# Patient Record
Sex: MALE | Race: WHITE | Employment: FULL TIME | URBAN - METROPOLITAN AREA
[De-identification: names, ages, dates, MRNs, and addresses within clinical notes are randomized per-mention and may not be internally consistent; named-entity substitution may affect disease eponyms.]

---

## 2017-03-16 ENCOUNTER — OFFICE VISIT (OUTPATIENT)
Dept: FAMILY MEDICINE | Facility: CLINIC | Age: 47
End: 2017-03-16
Payer: COMMERCIAL

## 2017-03-16 VITALS
HEART RATE: 84 BPM | BODY MASS INDEX: 33.52 KG/M2 | HEIGHT: 66 IN | WEIGHT: 208.6 LBS | DIASTOLIC BLOOD PRESSURE: 86 MMHG | TEMPERATURE: 97.7 F | SYSTOLIC BLOOD PRESSURE: 116 MMHG

## 2017-03-16 DIAGNOSIS — E78.2 MIXED HYPERLIPIDEMIA: ICD-10-CM

## 2017-03-16 DIAGNOSIS — A69.20 LYME DISEASE: Primary | ICD-10-CM

## 2017-03-16 PROCEDURE — 99213 OFFICE O/P EST LOW 20 MIN: CPT | Performed by: NURSE PRACTITIONER

## 2017-03-16 RX ORDER — DOXYCYCLINE 100 MG/1
100 CAPSULE ORAL 2 TIMES DAILY
Qty: 28 CAPSULE | Refills: 0 | Status: SHIPPED | OUTPATIENT
Start: 2017-03-16 | End: 2017-03-30

## 2017-03-16 ASSESSMENT — ENCOUNTER SYMPTOMS
FATIGUE: 0
RHINORRHEA: 0
HEADACHES: 0
SHORTNESS OF BREATH: 0
SINUS PRESSURE: 0
COUGH: 0
VOMITING: 0
FEVER: 0
DIARRHEA: 0
WHEEZING: 0
EYE DISCHARGE: 0
DIAPHORESIS: 0
NAUSEA: 0
SORE THROAT: 0

## 2017-03-16 NOTE — MR AVS SNAPSHOT
After Visit Summary   3/16/2017    Lance Ramos    MRN: [id number]           Patient Information     Date Of Birth          1970        Visit Information        Provider Department      3/16/2017 1:40 PM Dayan Luna APRN [facility]        Today's Diagnoses     Lyme disease    -  1       Follow-ups after your visit        Additional Services     INFECTIOUS DISEASE REFERRAL       Your provider has referred you to: UM: [facility] [place] [phone]   [email or web address]  FHN: [facility] - [place] [phone]   [email or web address]    Please be aware that coverage of these services is subject to the terms and limitations of your health insurance plan.  Call member services at your health plan with any benefit or coverage questions.      Please bring the following with you to your appointment:    (1) Any X-Rays, CTs or MRIs which have been performed.  Contact the facility where they were done to arrange for  prior to your scheduled appointment.    (2) List of current medications   (3) This referral request   (4) Any documents/labs given to you for this referral                  Who to contact     Normal or non-critical lab and imaging results will be communicated to you by MyChart, letter or phone within 4 business days after the clinic has received the results. If you do not hear from us within 7 days, please contact the clinic through MyChart or phone. If you have a critical or abnormal lab result, we will notify you by phone as soon as possible.  Submit refill requests through [employer] or call your pharmacy and they will forward the refill request to us. Please allow 3 business days for your refill to be completed.          If you need to speak with a  for additional information , please call: [phone]         "   Additional Information About Your Visit        MyChart Information     [employer] gives you secure access to your electronic health record. If you see a primary care provider, you can also send messages to your care team and make appointments. If you have questions, please call your primary care clinic.  If you do not have a primary care provider, please call [phone] and they will assist you.        Care EveryWhere ID     This is your Care EveryWhere ID. This could be used by other organizations to access your [place] medical records  [id number]        Your Vitals Were     Pulse Temperature Height BMI (Body Mass Index)          84 97.7  F (36.5  C) (Tympanic) 5' 6\" (1.676 m) 33.67 kg/m2         Blood Pressure from Last 3 Encounters:   03/16/17 116/86   10/28/16 (!) 137/92   06/23/16 118/80    Weight from Last 3 Encounters:   03/16/17 208 lb 9.6 oz (94.6 kg)   10/28/16 204 lb (92.5 kg)   06/23/16 206 lb (93.4 kg)              We Performed the Following     INFECTIOUS DISEASE REFERRAL          Today's Medication Changes          These changes are accurate as of: 3/16/17  2:14 PM.  If you have any questions, ask your nurse or doctor.               Start taking these medicines.        Dose/Directions    doxycycline 100 MG capsule   Commonly known as:  VIBRAMYCIN   Used for:  Lyme disease   Started by:  Dayan Luna APRN CNP        Dose:  100 mg   Take 1 capsule (100 mg) by mouth 2 times daily for 14 days   Quantity:  28 capsule   Refills:  0            Where to get your medicines      These medications were sent to [id number] [place] [facility] - [address] [place] [address] [address] [place] AT [address] [place], [place] [address]     Phone:  [phone]     doxycycline 100 MG capsule                Primary Care Provider Office Phone # Fax #    Blanca Merritt [phone]       [place] [facility] [address] [place] [address]      "   Thank you!     Thank you for choosing [facility]  for your care. Our goal is always to provide you with excellent care. Hearing back from our patients is one way we can continue to improve our services. Please take a few minutes to complete the written survey that you may receive in the mail after your visit with us. Thank you!             Your Updated Medication List - Protect others around you: Learn how to safely use, store and throw away your medicines at www.disposemymeds.org.          This list is accurate as of: 3/16/17  2:14 PM.  Always use your most recent med list.                   Brand Name Dispense Instructions for use    atorvastatin 20 MG tablet    LIPITOR    90 tablet    Take 1 tablet (20 mg) by mouth daily       doxycycline 100 MG capsule    VIBRAMYCIN    28 capsule    Take 1 capsule (100 mg) by mouth 2 times daily for 14 days       HYDROcodone-acetaminophen 5-325 MG per tablet    NORCO    30 tablet    Take 1-2 tablets by mouth every 4 hours as needed for other (Moderate to Severe Pain).       hydrOXYzine 25 MG capsule    VISTARIL    30 capsule    Take 2 capsules by mouth every 6 hours as needed for itching.       IBUPROFEN PO          Multi-vitamin Tabs tablet   Generic drug:  multivitamin, therapeutic with minerals      Take 1 tablet by mouth daily Reported on 3/16/2017       OMEGA-3 FISH OIL PO      Reported on 3/16/2017       senna-docusate 8.6-50 MG per tablet    SENOKOT-S;PERICOLACE    14 tablet    Take 2 tablets by mouth daily as needed for constipation.       ZOCOR PO      Take 40 mg by mouth Reported on 3/16/2017

## 2017-03-16 NOTE — LETTER
[facility]  [address] [address]  [phone]        April 23, 2018    Lance Ramos  [address] [address] [address]              Dear Lance Ramos    This is to remind you that your fasting lab is due.    You may call our office at [phone] to schedule an appointment.    Please disregard this notice if you have already had your labs drawn or made an appointment.        Sincerely,        Dayan Luna RN, CNP

## 2017-03-16 NOTE — PROGRESS NOTES
SUBJECTIVE:                                                    Lance Ramos is a 46 year old male who presents to clinic today for the following health issues:      Musculoskeletal problem/pain      Duration: since October 2016    Description  Location: both feet and ankles     Intensity:  severe    Accompanying signs and symptoms: painful and swollen, stinging pain at left ankle    History  Previous similar problem: YES- had Lyme Disease 10/28/16-he was treated with doxycycline, 11/18/16 doxycycline was refilled for continued symptoms.  The only time he has complete relief is when he is taking doxycycline  Previous evaluation:  x-ray    Precipitating or alleviating factors:  Trauma or overuse: no   Aggravating factors include: not being on doxycycline    Therapies tried and outcome: doxycycline is effective     Numbness to feet off and on since Oct. Will have numbness to both feet. If get pain/stingling to left ankle. Will have pain to top of foot. Will have pain all over foot. If has pain in ankle will be sharp pain. If has pain to top of foot will be more sore like a bruise. If is in arch will feel like has to stretch it out. Has had ankle surgeries to both feet due to multiple sports injuries. This was about 8 years ago. If gets pian in ankle will pains to legs as well-from walking different. Will take some ibuprofen for pain and that does decrease the pain some. Usually tries to wear shoes with support. Did try and get more supportive shoes and that did help some but the pain is still there. Only time that was better when was on doxycycline. Believes that still may have lyme. Daughter had lyme and was treated for 3+ months with doxy. Wife also had lyme and was treated with doxy for 6 months.       Problem list and histories reviewed & adjusted, as indicated.  Additional history: as documented    Current Outpatient Prescriptions   Medication Sig Dispense Refill     IBUPROFEN PO        doxycycline  "(VIBRAMYCIN) 100 MG capsule Take 1 capsule (100 mg) by mouth 2 times daily for 14 days 28 capsule 0     No Known Allergies    Reviewed and updated as needed this visit by clinical staff  Tobacco  Allergies  Meds  Med Hx  Surg Hx  Fam Hx  Soc Hx      Reviewed and updated as needed this visit by Provider         ROS:  Review of Systems   Constitutional: Negative for diaphoresis, fatigue and fever.   HENT: Negative for congestion, ear pain, rhinorrhea, sinus pressure and sore throat.    Eyes: Negative for discharge.   Respiratory: Negative for cough, shortness of breath and wheezing.    Cardiovascular: Negative for chest pain.   Gastrointestinal: Negative for diarrhea, nausea and vomiting.   Musculoskeletal:        Pian to bilat feet and ankles      Neurological: Negative for headaches.         OBJECTIVE:                                                    /86 (BP Location: Left arm, Patient Position: Chair, Cuff Size: Adult Regular)  Pulse 84  Temp 97.7  F (36.5  C) (Tympanic)  Ht 5' 6\" (1.676 m)  Wt 208 lb 9.6 oz (94.6 kg)  BMI 33.67 kg/m2  Body mass index is 33.67 kg/(m^2).  Physical Exam   Constitutional: He appears well-developed and well-nourished.   HENT:   Head: Normocephalic and atraumatic.   Right Ear: Tympanic membrane and external ear normal.   Left Ear: Tympanic membrane and external ear normal.   Nose: No mucosal edema or rhinorrhea.   Cardiovascular: Normal rate, regular rhythm and normal heart sounds.    Pulmonary/Chest: Effort normal and breath sounds normal.   Abdominal: Soft. Bowel sounds are normal.   Neurological: He is alert.   Skin: Skin is warm and dry.   Psychiatric: He has a normal mood and affect.        ASSESSMENT/PLAN:                                                    1. Lyme disease  Requests referral to specialist  Declines repeat blood test  Declines refer to podiatry   - INFECTIOUS DISEASE REFERRAL  - doxycycline (VIBRAMYCIN) 100 MG capsule; Take 1 capsule (100 mg) by mouth " 2 times daily for 14 days  Dispense: 28 capsule; Refill: 0    2. Mixed hyperlipidemia  Return for fasting labs in late April     SHIVA Otero [facility]

## 2017-03-16 NOTE — NURSING NOTE
"Chief Complaint   Patient presents with     Foot Pain       Initial /86 (BP Location: Left arm, Patient Position: Chair, Cuff Size: Adult Regular)  Pulse 84  Temp 97.7  F (36.5  C) (Tympanic)  Ht 5' 6\" (1.676 m)  Wt 208 lb 9.6 oz (94.6 kg)  BMI 33.67 kg/m2 Estimated body mass index is 33.67 kg/(m^2) as calculated from the following:    Height as of this encounter: 5' 6\" (1.676 m).    Weight as of this encounter: 208 lb 9.6 oz (94.6 kg).  Medication Reconciliation: complete     April AMAIRANI Torres      "

## 2017-04-03 ENCOUNTER — TELEPHONE (OUTPATIENT)
Dept: FAMILY MEDICINE | Facility: CLINIC | Age: 47
End: 2017-04-03

## 2017-04-03 DIAGNOSIS — M79.671 PAIN IN BOTH FEET: Primary | ICD-10-CM

## 2017-04-03 DIAGNOSIS — M79.672 PAIN IN BOTH FEET: Primary | ICD-10-CM

## 2017-04-03 NOTE — TELEPHONE ENCOUNTER
Madelyn from [facility] called and stated Lance was seen today 4-03-17 at [facility] for left ankle pain and a MRI was ordered by [facility] physician for tomorrow 4-04-17    Please place referral for patient for [facility] , needs to be sent to patients health insurance company [employer].     Once referral is completed please route chart to [place] in referrals.     Coleen [facility]

## 2017-04-14 ENCOUNTER — THERAPY VISIT (OUTPATIENT)
Dept: PHYSICAL THERAPY | Facility: CLINIC | Age: 47
End: 2017-04-14
Payer: COMMERCIAL

## 2017-04-14 DIAGNOSIS — M76.822 LEFT TIBIALIS POSTERIOR TENDINITIS: Primary | ICD-10-CM

## 2017-04-14 PROCEDURE — 97161 PT EVAL LOW COMPLEX 20 MIN: CPT | Mod: GP | Performed by: PHYSICAL THERAPIST

## 2017-04-14 PROCEDURE — 97110 THERAPEUTIC EXERCISES: CPT | Mod: GP | Performed by: PHYSICAL THERAPIST

## 2017-04-14 NOTE — MR AVS SNAPSHOT
After Visit Summary   4/14/2017    Lance Ramos    MRN: [id number]           Patient Information     Date Of Birth          1970        Visit Information        Provider Department      4/14/2017 12:20 PM Barney Carter, PT [facility]        Today's Diagnoses     Left tibialis posterior tendinitis    -  1       Follow-ups after your visit        Your next 10 appointments already scheduled     Apr 20, 2017 11:00 AM CDT   [facility] Extremity with Barney Carter, PT   [facility] ([facility] [place]    [address]  [place] [address] [phone]            Apr 28, 2017  2:50 PM CDT   [facility] Extremity with Barney Carter, PT   [facility] ([facility] [place]    [address]  [place] [id number] [phone]              Who to contact     If you have questions or need follow up information about today's clinic visit or your schedule please contact [facility] directly at [phone].  Normal or non-critical lab and imaging results will be communicated to you by [place] community health[facility], letter or phone within 4 business days after the clinic has received the results. If you do not hear from us within 7 days, please contact the clinic through [employer] or phone. If you have a critical or abnormal lab result, we will notify you by phone as soon as possible.  Submit refill requests through [employer] or call your pharmacy and they will forward the refill request to us. Please allow 3 business days for your refill to be completed.          Additional Information About Your Visit        [place] community health[facility] Information     [employer] gives you secure access to your electronic health record. If you see a primary care provider, you can also send messages to your care team and make appointments. If you have questions, please call your primary care clinic.  If you do not have a primary care provider, please call [phone] and they will assist you.        Care EveryWhere ID      This is your Care EveryWhere ID. This could be used by other organizations to access your [place] medical records  [id number]         Blood Pressure from Last 3 Encounters:   03/16/17 116/86   10/28/16 (!) 137/92   06/23/16 118/80    Weight from Last 3 Encounters:   03/16/17 94.6 kg (208 lb 9.6 oz)   10/28/16 92.5 kg (204 lb)   06/23/16 93.4 kg (206 lb)              We Performed the Following     HC PT EVAL, LOW COMPLEXITY     [facility] INITIAL EVAL REPORT     THERAPEUTIC EXERCISES        Primary Care Provider Office Phone # Fax #    Blanca Merritt [phone]       [facility] [address] [place] [address]        Thank you!     Thank you for choosing [facility]  for your care. Our goal is always to provide you with excellent care. Hearing back from our patients is one way we can continue to improve our services. Please take a few minutes to complete the written survey that you may receive in the mail after your visit with us. Thank you!             Your Updated Medication List - Protect others around you: Learn how to safely use, store and throw away your medicines at www.disposemymeds.org.          This list is accurate as of: 4/14/17  1:25 PM.  Always use your most recent med list.                   Brand Name Dispense Instructions for use    IBUPROFEN PO

## 2017-04-14 NOTE — LETTER
[facility]  [address]  [place] [address]  [phone]    2017    Re: Lance Ramos   :   1970  MRN:  [id number]   REFERRING PHYSICIAN:   [facility]    Date of Initial Evaluation:  17  Visits:  Rxs Used: 1  Reason for Referral:  Left tibialis posterior tendinitis    [facility] Initial Evaluation    Subjective:  Patient is a 46 year old male presenting with rehab left ankle/foot hpi.   Lance Ramos is a 46 year old male with a left ankle and left foot condition.  Condition occurred with:  Repetition/overuse and a wrong landing.  Condition occurred: in the community and during recreation/sport.  This is a new condition  17 pt saw MD for L medial foot/ankle pain.    Patient reports pain:  Anterior, medial and lower leg.  Radiates to:  No radiation.  Pain is described as aching and sharp and is intermittent and reported as 9/10.  Associated symptoms:  Loss of motion/stiffness and loss of strength. Pain is worse during the day.  Symptoms are exacerbated by activity, certain positions and walking (playing hockey/skating) and relieved by rest.  Since onset symptoms are unchanged.  Special tests:  MRI (+ dalila-medial bone fragment that is not acute finding, posterior tibialis tendonitis).      General health as reported by patient is good.  Pertinent medical history includes:  None.  Medical allergies: no.  Other surgeries include:  Orthopedic surgery (B ankles, nose).  Current medications:  None as reported by patient.  Current occupation is   .  Patient is working in normal job without restrictions.  Primary job tasks include:  Prolonged sitting.  Barriers include:  None as reported by patient.  Red flags:  None as reported by patient.    ANKLE:     PROM L PROM R AROM L AROM R MMT L MMT R   Dorsiflexion   -5 -2(2shy of neutral) 5/5 5/5   Plantarflexion   45 46 5/5 5/5   Inversion    15 15 5/5 5/5   Eversion   2 10 5/5 5/5   G Toe Ext         G Toe Flex           Edema:  Mild edema over L dalila-medial ankle    Palpation: TTP L dalila-medial and postero-medial ankle    Gait: bilateral pronation L>R, increased ER on Lvs R    Special Tests: all negative   L R   Anterior Drawer     Posterior Drawer     Valgus Stress     Varus Stress     External Rotation     Ahn's (Achilles)     Scott's       Plan to improve B ankle AROM, strengthen foot intrinsics and eccentrics for post tib tendon, balance,manual therapy for joint mobility, return to sport as tolerated.    Assessment/Plan:    Patient is a 46 year old male with left side ankle complaints.    Patient has the following significant findings with corresponding treatment plan.                Diagnosis 1:  Post tibialis tendonitis  Pain -  hot/cold therapy, US, electric stimulation, manual therapy, splint/taping/bracing/orthotics, education and home program  Decreased ROM/flexibility - manual therapy, therapeutic exercise, therapeutic activity and home program  Decreased joint mobility - manual therapy, therapeutic exercise, therapeutic activity and home program    Therapy Evaluation Codes:   1) History comprised of:   Personal factors that impact the plan of care:      None.    Comorbidity factors that impact the plan of care are:      None.     Medications impacting care: None.  2) Examination of Body Systems comprised of:   Body structures and functions that impact the plan of care:      Ankle.   Activity limitations that impact the plan of care are:      Sports and Walking.  3) Clinical presentation characteristics are:   Stable/Uncomplicated.  4) Decision-Making    Low complexity using standardized patient assessment instrument and/or measureable assessment of functional outcome.    Cumulative Therapy Evaluation is: Low complexity.  Previous and current functional limitations:  (See Goal Flow Sheet for this information)    Short term and Long  term goals: (See Goal Flow Sheet for this information)   Communication ability:  Patient appears to be able to clearly communicate and understand verbal and written communication and follow directions correctly.  Treatment Explanation - The following has been discussed with the patient:   RX ordered/plan of care  Anticipated outcomes  Possible risks and side effects  This patient would benefit from PT intervention to resume normal activities.   Rehab potential is good.    Frequency:  1 X week, once daily  Duration:  for 12 weeks  Discharge Plan:  Achieve all LTG.  Independent in home treatment program.  Return to previous functional level by discharge.  Reach maximal therapeutic benefit.    Thank you for your referral.    INQUIRIES  Therapist: Barney Carter DPT [facility]YS[facility]AP[facility]  [address]  [place] [address]  Phone: [phone]  Fax: [phone]

## 2017-04-28 ENCOUNTER — THERAPY VISIT (OUTPATIENT)
Dept: PHYSICAL THERAPY | Facility: CLINIC | Age: 47
End: 2017-04-28
Payer: COMMERCIAL

## 2017-04-28 DIAGNOSIS — M76.822 LEFT TIBIALIS POSTERIOR TENDINITIS: ICD-10-CM

## 2017-04-28 PROCEDURE — 97110 THERAPEUTIC EXERCISES: CPT | Mod: GP | Performed by: PHYSICAL THERAPIST

## 2017-04-28 NOTE — MR AVS SNAPSHOT
After Visit Summary   4/28/2017    Lance Ramos    MRN: [id number]           Patient Information     Date Of Birth          1970        Visit Information        Provider Department      4/28/2017 2:50 PM Barney Carter PT [facility]        Today's Diagnoses     Left tibialis posterior tendinitis           Follow-ups after your visit        Who to contact     If you have questions or need follow up information about today's clinic visit or your schedule please contact [facility] directly at [phone].  Normal or non-critical lab and imaging results will be communicated to you by [employer], letter or phone within 4 business days after the clinic has received the results. If you do not hear from us within 7 days, please contact the clinic through [employer] or phone. If you have a critical or abnormal lab result, we will notify you by phone as soon as possible.  Submit refill requests through [employer] or call your pharmacy and they will forward the refill request to us. Please allow 3 business days for your refill to be completed.          Additional Information About Your Visit        MyChart Information     [employer] gives you secure access to your electronic health record. If you see a primary care provider, you can also send messages to your care team and make appointments. If you have questions, please call your primary care clinic.  If you do not have a primary care provider, please call [phone] and they will assist you.        Care EveryWhere ID     This is your Care EveryWhere ID. This could be used by other organizations to access your [facility] medical records  [id number]         Blood Pressure from Last 3 Encounters:   03/16/17 116/86   10/28/16 (!) 137/92   06/23/16 118/80    Weight from Last 3 Encounters:   03/16/17 94.6 kg (208 lb 9.6 oz)   10/28/16 92.5 kg (204 lb)   06/23/16 93.4 kg (206 lb)              We Performed the Following      THERAPEUTIC EXERCISES        Primary Care Provider Office Phone # Fax #    Blanca Merritt [phone]       [place] [facility] [address] [place] [address]        Thank you!     Thank you for choosing [facility]  for your care. Our goal is always to provide you with excellent care. Hearing back from our patients is one way we can continue to improve our services. Please take a few minutes to complete the written survey that you may receive in the mail after your visit with us. Thank you!             Your Updated Medication List - Protect others around you: Learn how to safely use, store and throw away your medicines at www.disposemymeds.org.          This list is accurate as of: 4/28/17  3:39 PM.  Always use your most recent med list.                   Brand Name Dispense Instructions for use    IBUPROFEN PO

## 2017-06-09 PROBLEM — M76.822 LEFT TIBIALIS POSTERIOR TENDINITIS: Status: RESOLVED | Noted: 2017-04-14 | Resolved: 2017-06-09

## 2017-06-09 NOTE — PROGRESS NOTES
Subjective:    HPI                    Objective:    System    Physical Exam    General     ROS    Assessment/Plan:      DISCHARGE  REPORT    Progress reporting period is from 4/14/17 to 4/28/17.     SUBJECTIVE  Subjective: pt reports ankle feels good at rest but continues to be painful once he places hockey boot onto foot. currently finished one league but is in playoffs for the other league once weekly. again refuses ionto due to cost out of pocket.   Current Pain level: 0/10   Initial Pain level: 9/10   Changes in function: Yes, see goal flow sheet for change in function   Adverse reactions: None;   ,     The objective findings are from DOS 4/28/17.    OBJECTIVE  Objective: AROM L ankle df: 5degs pf: 45degs inv: 22degs ev: 3degs      ASSESSMENT/PLAN  Updated problem list and treatment plan: Diagnosis 1:  L foot/ankle pain  STG/LTGs have been met or progress has been made towards goals:  Yes, progressing towards being able to play hockey painfree.  Assessment of Progress: The patient's condition is improving.  The patient's condition has potential to improve.  The patient has not returned to therapy. Current status is unknown.  Self Management Plans:  Patient is independent in a home treatment program.  Lance continues to require the following intervention to meet STG and LTG's: HEP  We will discharge this patient from PT.    Recommendations:  Pt was seen clinically twice with some improvement in those two visits in regards to pain intensity and frequency however pt did not return to clinic for further followup visits. Current status is unknown and plan to disch PT services.    Please refer to the daily flowsheet for treatment today, total treatment time and time spent performing 1:1 timed codes.

## 2017-10-25 ENCOUNTER — OFFICE VISIT (OUTPATIENT)
Dept: FAMILY MEDICINE | Facility: CLINIC | Age: 47
End: 2017-10-25
Payer: COMMERCIAL

## 2017-10-25 VITALS
TEMPERATURE: 98.5 F | HEART RATE: 94 BPM | WEIGHT: 216.4 LBS | SYSTOLIC BLOOD PRESSURE: 134 MMHG | BODY MASS INDEX: 34.78 KG/M2 | HEIGHT: 66 IN | DIASTOLIC BLOOD PRESSURE: 84 MMHG | OXYGEN SATURATION: 98 %

## 2017-10-25 DIAGNOSIS — M79.674 PAIN OF TOE OF RIGHT FOOT: Primary | ICD-10-CM

## 2017-10-25 DIAGNOSIS — M10.071 ACUTE IDIOPATHIC GOUT OF RIGHT FOOT: ICD-10-CM

## 2017-10-25 LAB
BASOPHILS # BLD AUTO: 0 10E9/L (ref 0–0.2)
BASOPHILS NFR BLD AUTO: 0.4 %
DIFFERENTIAL METHOD BLD: NORMAL
EOSINOPHIL # BLD AUTO: 0.2 10E9/L (ref 0–0.7)
EOSINOPHIL NFR BLD AUTO: 2.2 %
ERYTHROCYTE [DISTWIDTH] IN BLOOD BY AUTOMATED COUNT: 12.3 % (ref 10–15)
HCT VFR BLD AUTO: 45.4 % (ref 40–53)
HGB BLD-MCNC: 15.6 G/DL (ref 13.3–17.7)
LYMPHOCYTES # BLD AUTO: 2.3 10E9/L (ref 0.8–5.3)
LYMPHOCYTES NFR BLD AUTO: 24 %
MCH RBC QN AUTO: 31.3 PG (ref 26.5–33)
MCHC RBC AUTO-ENTMCNC: 34.4 G/DL (ref 31.5–36.5)
MCV RBC AUTO: 91 FL (ref 78–100)
MONOCYTES # BLD AUTO: 0.9 10E9/L (ref 0–1.3)
MONOCYTES NFR BLD AUTO: 9 %
NEUTROPHILS # BLD AUTO: 6.1 10E9/L (ref 1.6–8.3)
NEUTROPHILS NFR BLD AUTO: 64.4 %
PLATELET # BLD AUTO: 252 10E9/L (ref 150–450)
RBC # BLD AUTO: 4.98 10E12/L (ref 4.4–5.9)
WBC # BLD AUTO: 9.5 10E9/L (ref 4–11)

## 2017-10-25 PROCEDURE — 85025 COMPLETE CBC W/AUTO DIFF WBC: CPT | Performed by: PHYSICIAN ASSISTANT

## 2017-10-25 PROCEDURE — 36415 COLL VENOUS BLD VENIPUNCTURE: CPT | Performed by: PHYSICIAN ASSISTANT

## 2017-10-25 PROCEDURE — 84550 ASSAY OF BLOOD/URIC ACID: CPT | Performed by: PHYSICIAN ASSISTANT

## 2017-10-25 PROCEDURE — 99214 OFFICE O/P EST MOD 30 MIN: CPT | Performed by: PHYSICIAN ASSISTANT

## 2017-10-25 RX ORDER — INDOMETHACIN 50 MG/1
CAPSULE ORAL
Qty: 18 CAPSULE | Refills: 0 | Status: SHIPPED | OUTPATIENT
Start: 2017-10-25 | End: 2017-12-01

## 2017-10-25 NOTE — NURSING NOTE
"Chief Complaint   Patient presents with     Arthritis     x2 days        Initial LMP 02/24/2017 (Exact Date) Estimated body mass index is 23.49 kg/(m^2) as calculated from the following:    Height as of 4/4/17: 5' 7\" (1.702 m).    Weight as of 4/4/17: 150 lb (68 kg).  Medication Reconciliation: complete     Ursula Rico CMA   "

## 2017-10-25 NOTE — PROGRESS NOTES
SUBJECTIVE:   Lance Ramos is a 47 year old male who presents to clinic today for the following health issues:    Gout  Duration: 2-3 days   Description   Location: big toe - right  Joint Swelling: YES  Redness: YES  Pain intensity:  Severe 9/10  Accompanying signs and symptoms: None  History  Previous history of gout: No  Trauma to the area: no   Precipitating factors:   Alcohol usage: Occassional (usually on weekends)  Diuretic use: no   Recent illness: no   Therapies tried and outcome: Ibuprofen, arthritis cream     Had URI symptoms last week.      Problem list and histories reviewed & adjusted, as indicated.  Additional history: as documented    Patient Active Problem List   Diagnosis     CARDIOVASCULAR SCREENING; LDL GOAL LESS THAN 160     Mixed hyperlipidemia     Past Surgical History:   Procedure Laterality Date     ARTHROSCOPY ANKLE  3/28/2012    Procedure:ARTHROSCOPY ANKLE; ANKLE ARTHROSCOPY, DEBRIDEMENT, LOOSE BODY EXCISION  (C-ARM); Surgeon:SOPHY BRAUN; Location:[facility]     ARTHROSCOPY ANKLE, OPEN REPAIR TENDON, COMBINED  7/23/2012    Procedure: COMBINED ARTHROSCOPY ANKLE, OPEN REPAIR TENDON;  LEFT ANKLE ARTHROSCOPY AND BROSTROM REPAIR ;  Surgeon: Sophy Braun MD;  Location: [facility]     ENT SURGERY      EPISTAXIS POSTERIOR SURGERY     LASIK       REPAIR TENDON FOOT  7/23/2012    Procedure: REPAIR TENDON FOOT;  Brostrom Repair;  Surgeon: Sophy Braun MD;  Location: [facility]     VASECTOMY         Social History   Substance Use Topics     Smoking status: Never Smoker     Smokeless tobacco: Never Used     Alcohol use Yes      Comment: 0 - 2 PER WEEK     History reviewed. No pertinent family history.      Current Outpatient Prescriptions   Medication Sig Dispense Refill     indomethacin (INDOCIN) 50 MG capsule 1 tab three times per day with meals x 4 days, then 1 tab two times per day x 2 days then 1 tab once per day x 2 days. 18 capsule 0     IBUPROFEN PO        BP Readings from Last 3  "Encounters:   10/25/17 134/84   03/16/17 116/86   10/28/16 (!) 137/92    Wt Readings from Last 3 Encounters:   10/25/17 216 lb 6.4 oz (98.2 kg)   03/16/17 208 lb 9.6 oz (94.6 kg)   10/28/16 204 lb (92.5 kg)                        Reviewed and updated as needed this visit by clinical staff     Reviewed and updated as needed this visit by Provider         ROS:  Constitutional, HEENT, cardiovascular, pulmonary, gi and gu systems are negative, except as otherwise noted.      OBJECTIVE:   /84  Pulse 94  Temp 98.5  F (36.9  C) (Tympanic)  Ht 5' 6\" (1.676 m)  Wt 216 lb 6.4 oz (98.2 kg)  SpO2 98%  BMI 34.93 kg/m2  Body mass index is 34.93 kg/(m^2).  GENERAL: healthy, alert and no distress  Right great toe:  Swollen, mild erythema and slightly warm to the touch around MCP joint.  Full flexion extension of toes and ankle.  Posterior Tibialis and dorsalis pedis pulses intact bilaterally.  Capillary refill less than 2 seconds bilateral lower extremities.       Diagnostic Test Results:  Results for orders placed or performed in visit on 10/25/17   Uric acid   Result Value Ref Range    Uric Acid 6.4 3.5 - 7.2 mg/dL   CBC with platelets differential   Result Value Ref Range    WBC 9.5 4.0 - 11.0 10e9/L    RBC Count 4.98 4.4 - 5.9 10e12/L    Hemoglobin 15.6 13.3 - 17.7 g/dL    Hematocrit 45.4 40.0 - 53.0 %    MCV 91 78 - 100 fl    MCH 31.3 26.5 - 33.0 pg    MCHC 34.4 31.5 - 36.5 g/dL    RDW 12.3 10.0 - 15.0 %    Platelet Count 252 150 - 450 10e9/L    Diff Method Automated Method     % Neutrophils 64.4 %    % Lymphocytes 24.0 %    % Monocytes 9.0 %    % Eosinophils 2.2 %    % Basophils 0.4 %    Absolute Neutrophil 6.1 1.6 - 8.3 10e9/L    Absolute Lymphocytes 2.3 0.8 - 5.3 10e9/L    Absolute Monocytes 0.9 0.0 - 1.3 10e9/L    Absolute Eosinophils 0.2 0.0 - 0.7 10e9/L    Absolute Basophils 0.0 0.0 - 0.2 10e9/L       ASSESSMENT/PLAN:         1. Pain of toe of right foot  Uric acid level normal, however symptoms are very " suggestive of gout (especially given previous similar attack 1 year ago).  I have also considered cellulitis in my differential, however he is afebrile, white count normal and exam is consistent with gout.    - Uric acid  - CBC with platelets differential  - indomethacin (INDOCIN) 50 MG capsule; 1 tab three times per day with meals x 4 days, then 1 tab two times per day x 2 days then 1 tab once per day x 2 days.  Dispense: 18 capsule; Refill: 0    2. Acute idiopathic gout of right foot  Acute Gout Flare    Please refer to today's diagnoses and orders for further details.  I briefly discussed the pathophysiology of Gout and outlined the expected course.  Patient is to limit alcohol, meats and high purine foods.  Patient education materials on gout were given to patient.     I discussed the warning symptoms and signs that indicate an atypical course that would need urgent follow up including findings that would require a call to 911. I also discussed strategies that could provide symptomatic relief.            FUTURE APPOINTMENTS:       - Follow-up visit if symptoms worsen or fail to improve as anticipated.     Mona Lockwood PA-C  [facility]

## 2017-10-25 NOTE — PATIENT INSTRUCTIONS
Gout Diet  Gout is a painful condition caused by an excess of uric acid, a waste product made by the body. Uric acid forms crystals that collect in the joints. The immune response to these crystals brings on symptoms of joint pain and swelling. This is called a gout attack. Often, medications and diet changes are combined to manage gout. Below are some guidelines for changing your diet to help you manage gout and prevent attacks. Your health care provider will help you determine the best eating plan for you.     Eating to manage gout  Weight loss for those who are overweight may help reduce gout attacks.  Eat less of these foods  Eating too many foods containing purines may raise the levels of uric acid in your body. This raises your risk for a gout attack. Try to limit these foods and drinks:    Alcohol, such as beer and red wine. You may be told to avoid alcohol completely.    Soft drinks that contain sugar or high fructose corn syrup    Certain fish, including anchovies, sardines, fish eggs, and herring    Shellfish    Certain meats, such as red meat, hot dogs, luncheon meats, and turkey    Organ meats, such as liver, kidneys, and sweetbreads    Legumes, such as dried beans and peas    Other high fat foods such as gravy, whole milk, and high fat cheeses    Vegetables such as asparagus, cauliflower, spinach, and mushrooms used to be thought to contribute to an increased risk for a gout attack, but recent studies show that high purine vegetables don't increase the risk for a gout attack.  Eat more of these foods  Other foods may be helpful for people with gout. Add some of these foods to your diet:    Cherries contain chemicals that may lower uric acid.    Omega fatty acids. These are found in some fatty fish such as salmon, certain oils (flax, olive, or nut), and nuts themselves. Omega fatty acids may help prevent inflammation due to gout.    Dairy products that are low-fat or fat-free, such as cheese and  yogurt    Complex carbohydrate foods, including whole grains, brown rice, oats, and beans    Coffee, in moderation    Water, approximately 64 ounces per day  Follow-up care  Follow up with your healthcare provider as advised.  When to seek medical advice  Call your healthcare provider right away if any of these occur:    Return of gout symptoms, usually at night:    Severe pain, swelling, and heat in a joint, especially the base of the big toe    Affected joint is hard to move    Skin of the affected joint is purple or red    Fever of 100.4 F (38 C) or higher    Pain that doesn't get better even with prescribed medicine   Date Last Reviewed: 1/12/2016 2000-2017 [employer]. [address] [place] [address], [place] [address]. All rights reserved. This information is not intended as a substitute for professional medical care. Always follow your healthcare professional's instructions.

## 2017-10-25 NOTE — LETTER
October 26, 2017      Lance Ramos  [address] [address]        Dear Lance,     Your blood tests were all normal.  As we discussed in the office, your symptoms are consistent with gout and the indomethacin should help with the pain and swelling.   Please follow-up if you have any questions or concerns.     Sincerely,     Mona Lockwood PA-C     Resulted Orders   Uric acid   Result Value Ref Range    Uric Acid 6.4 3.5 - 7.2 mg/dL   CBC with platelets differential   Result Value Ref Range    WBC 9.5 4.0 - 11.0 10e9/L    RBC Count 4.98 4.4 - 5.9 10e12/L    Hemoglobin 15.6 13.3 - 17.7 g/dL    Hematocrit 45.4 40.0 - 53.0 %    MCV 91 78 - 100 fl    MCH 31.3 26.5 - 33.0 pg    MCHC 34.4 31.5 - 36.5 g/dL    RDW 12.3 10.0 - 15.0 %    Platelet Count 252 150 - 450 10e9/L    Diff Method Automated Method     % Neutrophils 64.4 %    % Lymphocytes 24.0 %    % Monocytes 9.0 %    % Eosinophils 2.2 %    % Basophils 0.4 %    Absolute Neutrophil 6.1 1.6 - 8.3 10e9/L    Absolute Lymphocytes 2.3 0.8 - 5.3 10e9/L    Absolute Monocytes 0.9 0.0 - 1.3 10e9/L    Absolute Eosinophils 0.2 0.0 - 0.7 10e9/L    Absolute Basophils 0.0 0.0 - 0.2 10e9/L

## 2017-10-25 NOTE — MR AVS SNAPSHOT
After Visit Summary   10/25/2017    Lance Ramos    MRN: [id number]           Patient Information     Date Of Birth          1970        Visit Information        Provider Department      10/25/2017 2:00 PM Mona Lockwood PA-C [facility]        Today's Diagnoses     Pain of toe of right foot    -  1      Care Instructions      Gout Diet  Gout is a painful condition caused by an excess of uric acid, a waste product made by the body. Uric acid forms crystals that collect in the joints. The immune response to these crystals brings on symptoms of joint pain and swelling. This is called a gout attack. Often, medications and diet changes are combined to manage gout. Below are some guidelines for changing your diet to help you manage gout and prevent attacks. Your health care provider will help you determine the best eating plan for you.     Eating to manage gout  Weight loss for those who are overweight may help reduce gout attacks.  Eat less of these foods  Eating too many foods containing purines may raise the levels of uric acid in your body. This raises your risk for a gout attack. Try to limit these foods and drinks:    Alcohol, such as beer and red wine. You may be told to avoid alcohol completely.    Soft drinks that contain sugar or high fructose corn syrup    Certain fish, including anchovies, sardines, fish eggs, and herring    Shellfish    Certain meats, such as red meat, hot dogs, luncheon meats, and turkey    Organ meats, such as liver, kidneys, and sweetbreads    Legumes, such as dried beans and peas    Other high fat foods such as gravy, whole milk, and high fat cheeses    Vegetables such as asparagus, cauliflower, spinach, and mushrooms used to be thought to contribute to an increased risk for a gout attack, but recent studies show that high purine vegetables don't increase the risk for a gout attack.  Eat more of these foods  Other foods may be helpful for  people with gout. Add some of these foods to your diet:    Cherries contain chemicals that may lower uric acid.    Omega fatty acids. These are found in some fatty fish such as salmon, certain oils (flax, olive, or nut), and nuts themselves. Omega fatty acids may help prevent inflammation due to gout.    Dairy products that are low-fat or fat-free, such as cheese and yogurt    Complex carbohydrate foods, including whole grains, brown rice, oats, and beans    Coffee, in moderation    Water, approximately 64 ounces per day  Follow-up care  Follow up with your healthcare provider as advised.  When to seek medical advice  Call your healthcare provider right away if any of these occur:    Return of gout symptoms, usually at night:    Severe pain, swelling, and heat in a joint, especially the base of the big toe    Affected joint is hard to move    Skin of the affected joint is purple or red    Fever of 100.4 F (38 C) or higher    Pain that doesn't get better even with prescribed medicine   Date Last Reviewed: 1/12/2016 2000-2017 [employer]. [address] [place] [address]. All rights reserved. This information is not intended as a substitute for professional medical care. Always follow your healthcare professional's instructions.                Follow-ups after your visit        Who to contact     Normal or non-critical lab and imaging results will be communicated to you by MyChart, letter or phone within 4 business days after the clinic has received the results. If you do not hear from us within 7 days, please contact the clinic through MyChart or phone. If you have a critical or abnormal lab result, we will notify you by phone as soon as possible.  Submit refill requests through [facility] or call your pharmacy and they will forward the refill request to us. Please allow 3 business days for your refill to be completed.          If you need to speak with a  for additional  "information , please call: [phone]           Additional Information About Your Visit        [email or web address] Information     [email or web address] gives you secure access to your electronic health record. If you see a primary care provider, you can also send messages to your care team and make appointments. If you have questions, please call your primary care clinic.  If you do not have a primary care provider, please call [phone] and they will assist you.        Care EveryWhere ID     This is your Care EveryWhere ID. This could be used by other organizations to access your [place] medical records  [id number]        Your Vitals Were     Pulse Temperature Height Pulse Oximetry BMI (Body Mass Index)       94 98.5  F (36.9  C) (Tympanic) 5' 6\" (1.676 m) 98% 34.93 kg/m2        Blood Pressure from Last 3 Encounters:   10/25/17 134/84   03/16/17 116/86   10/28/16 (!) 137/92    Weight from Last 3 Encounters:   10/25/17 216 lb 6.4 oz (98.2 kg)   03/16/17 208 lb 9.6 oz (94.6 kg)   10/28/16 204 lb (92.5 kg)              We Performed the Following     CBC with platelets differential     Uric acid          Today's Medication Changes          These changes are accurate as of: 10/25/17  2:34 PM.  If you have any questions, ask your nurse or doctor.               Start taking these medicines.        Dose/Directions    indomethacin 50 MG capsule   Commonly known as:  INDOCIN   Used for:  Pain of toe of right foot   Started by:  Mona Lockwood PA-C        1 tab three times per day with meals x 4 days, then 1 tab two times per day x 2 days then 1 tab once per day x 2 days.   Quantity:  18 capsule   Refills:  0            Where to get your medicines      These medications were sent to [facility] [address]  [address] [address]     Phone:  [phone]     indomethacin 50 MG capsule                Primary Care Provider Office Phone # Fax #    Blanca Merritt [phone] " [phone]       [address] [place] [address]        Equal Access to Services     CADY BETTENCOURT : Hadii aad ku hadrashmielise Soshelly, waaxda luqadaha, qaybta kaalmada vargasradhaabhishek, sharonda trejogamaljanine jones. So [facility] [phone].    ATENCIÓN: Si habla español, tiene a renee disposición servicios gratuitos de asistencia lingüística. Llame al [phone].    We comply with applicable federal civil rights laws and [place] laws. We do not discriminate on the basis of race, color, national origin, age, disability, sex, sexual orientation, or gender identity.            Thank you!     Thank you for choosing [facility] [place]  for your care. Our goal is always to provide you with excellent care. Hearing back from our patients is one way we can continue to improve our services. Please take a few minutes to complete the written survey that you may receive in the mail after your visit with us. Thank you!             Your Updated Medication List - Protect others around you: Learn how to safely use, store and throw away your medicines at www.disposemymeds.org.          This list is accurate as of: 10/25/17  2:34 PM.  Always use your most recent med list.                   Brand Name Dispense Instructions for use Diagnosis    IBUPROFEN PO           indomethacin 50 MG capsule    INDOCIN    18 capsule    1 tab three times per day with meals x 4 days, then 1 tab two times per day x 2 days then 1 tab once per day x 2 days.    Pain of toe of right foot

## 2017-10-26 LAB — URATE SERPL-MCNC: 6.4 MG/DL (ref 3.5–7.2)

## 2017-10-27 PROBLEM — M10.071 ACUTE IDIOPATHIC GOUT OF RIGHT FOOT: Status: ACTIVE | Noted: 2017-10-27

## 2017-10-31 ENCOUNTER — TELEPHONE (OUTPATIENT)
Dept: FAMILY MEDICINE | Facility: CLINIC | Age: 47
End: 2017-10-31

## 2017-10-31 DIAGNOSIS — M10.071 ACUTE IDIOPATHIC GOUT OF RIGHT FOOT: Primary | ICD-10-CM

## 2017-10-31 RX ORDER — COLCHICINE 0.6 MG/1
TABLET ORAL
Qty: 15 TABLET | Refills: 0 | Status: SHIPPED | OUTPATIENT
Start: 2017-10-31 | End: 2017-12-01

## 2017-10-31 NOTE — TELEPHONE ENCOUNTER
Patient called and said he is still having issues with his foot and would like to try the medication he can take everyday for gout.  Patient says its hard to put his shoe on.  He also wants to know if there is anything for instant relief till this medication kicks in.    Carmen Tsang, [facility]

## 2017-10-31 NOTE — TELEPHONE ENCOUNTER
Please call Lance,   I have sent a script of colchicine to his pharmacy.  Si tabs at the onset of flare, then 1 tab 2 hours later if pain persists.  We cannot start the daily medication until this flare up resolves.  I suggest he schedule a f/u visit if the pain does not improve or if he would like to discuss starting the preventative medication once this pain resolves.   Mona Lockwood PA-C

## 2017-10-31 NOTE — TELEPHONE ENCOUNTER
Patient called back and will  the medication. I did explain that his flare should subside prior to taking a preventative medication. Jolene Leblanc RN

## 2017-12-01 ENCOUNTER — OFFICE VISIT (OUTPATIENT)
Dept: FAMILY MEDICINE | Facility: CLINIC | Age: 47
End: 2017-12-01
Payer: COMMERCIAL

## 2017-12-01 VITALS
BODY MASS INDEX: 33.91 KG/M2 | HEART RATE: 88 BPM | TEMPERATURE: 96.6 F | WEIGHT: 211 LBS | HEIGHT: 66 IN | SYSTOLIC BLOOD PRESSURE: 131 MMHG | DIASTOLIC BLOOD PRESSURE: 82 MMHG

## 2017-12-01 DIAGNOSIS — M21.612 BILATERAL BUNIONS: ICD-10-CM

## 2017-12-01 DIAGNOSIS — M21.611 BILATERAL BUNIONS: ICD-10-CM

## 2017-12-01 DIAGNOSIS — M79.675 PAIN IN TOES OF BOTH FEET: ICD-10-CM

## 2017-12-01 DIAGNOSIS — Z23 NEED FOR PROPHYLACTIC VACCINATION AND INOCULATION AGAINST INFLUENZA: ICD-10-CM

## 2017-12-01 DIAGNOSIS — M10.9 GOUT INVOLVING TOE, UNSPECIFIED CAUSE, UNSPECIFIED CHRONICITY, UNSPECIFIED LATERALITY: Primary | ICD-10-CM

## 2017-12-01 DIAGNOSIS — M79.674 PAIN IN TOES OF BOTH FEET: ICD-10-CM

## 2017-12-01 PROBLEM — M10.071 ACUTE IDIOPATHIC GOUT OF RIGHT FOOT: Status: RESOLVED | Noted: 2017-10-27 | Resolved: 2017-12-01

## 2017-12-01 PROCEDURE — 36415 COLL VENOUS BLD VENIPUNCTURE: CPT | Performed by: PHYSICIAN ASSISTANT

## 2017-12-01 PROCEDURE — 90686 IIV4 VACC NO PRSV 0.5 ML IM: CPT | Performed by: PHYSICIAN ASSISTANT

## 2017-12-01 PROCEDURE — 99214 OFFICE O/P EST MOD 30 MIN: CPT | Mod: 25 | Performed by: PHYSICIAN ASSISTANT

## 2017-12-01 PROCEDURE — 80048 BASIC METABOLIC PNL TOTAL CA: CPT | Performed by: PHYSICIAN ASSISTANT

## 2017-12-01 PROCEDURE — 90471 IMMUNIZATION ADMIN: CPT | Performed by: PHYSICIAN ASSISTANT

## 2017-12-01 PROCEDURE — 84550 ASSAY OF BLOOD/URIC ACID: CPT | Performed by: PHYSICIAN ASSISTANT

## 2017-12-01 RX ORDER — PREDNISONE 20 MG/1
40 TABLET ORAL DAILY
Qty: 10 TABLET | Refills: 0 | Status: SHIPPED | OUTPATIENT
Start: 2017-12-01 | End: 2017-12-06

## 2017-12-01 NOTE — PATIENT INSTRUCTIONS
I am considering two causes for your foot pain.  It's either gout, which we will recheck the uric acid level and a 24 hour urine excretion level or it may be due to bunions (see below).     I have placed a referral to a podiatrist, I would like you to meet with them to discuss the bunions in more detail.    Call the following number for that ~ [phone]      Gout Diet  Gout is a painful condition caused by an excess of uric acid, a waste product made by the body. Uric acid forms crystals that collect in the joints. The immune response to these crystals brings on symptoms of joint pain and swelling. This is called a gout attack. Often, medications and diet changes are combined to manage gout. Below are some guidelines for changing your diet to help you manage gout and prevent attacks. Your health care provider will help you determine the best eating plan for you.     Eating to manage gout  Weight loss for those who are overweight may help reduce gout attacks.  Eat less of these foods  Eating too many foods containing purines may raise the levels of uric acid in your body. This raises your risk for a gout attack. Try to limit these foods and drinks:    Alcohol, such as beer and red wine. You may be told to avoid alcohol completely.    Soft drinks that contain sugar or high fructose corn syrup    Certain fish, including anchovies, sardines, fish eggs, and herring    Shellfish    Certain meats, such as red meat, hot dogs, luncheon meats, and turkey    Organ meats, such as liver, kidneys, and sweetbreads    Legumes, such as dried beans and peas    Other high fat foods such as gravy, whole milk, and high fat cheeses    Vegetables such as asparagus, cauliflower, spinach, and mushrooms used to be thought to contribute to an increased risk for a gout attack, but recent studies show that high purine vegetables don't increase the risk for a gout attack.  Eat more of these foods  Other foods may be helpful for people  with gout. Add some of these foods to your diet:    Cherries contain chemicals that may lower uric acid.    Omega fatty acids. These are found in some fatty fish such as salmon, certain oils (flax, olive, or nut), and nuts themselves. Omega fatty acids may help prevent inflammation due to gout.    Dairy products that are low-fat or fat-free, such as cheese and yogurt    Complex carbohydrate foods, including whole grains, brown rice, oats, and beans    Coffee, in moderation    Water, approximately 64 ounces per day  Follow-up care  Follow up with your healthcare provider as advised.  When to seek medical advice  Call your healthcare provider right away if any of these occur:    Return of gout symptoms, usually at night:    Severe pain, swelling, and heat in a joint, especially the base of the big toe    Affected joint is hard to move    Skin of the affected joint is purple or red    Fever of 100.4 F (38 C) or higher    Pain that doesn't get better even with prescribed medicine   Date Last Reviewed: 1/12/2016 2000-2017 The [employer]. [address] [place] [address]. All rights reserved. This information is not intended as a substitute for professional medical care. Always follow your healthcare professional's instructions.          Bunion    You have a bunion. This is a bony bump at the base of your big toe, along the inside edge of your foot. As the bump gets bigger, it can become red, swollen, and painful with shoe wear.  Bunions may occur if you wear shoes that are too tight and pinch your toes together. High heels may make this worse. In some cases a bunion is due to poor alignment of the foot and ankle. This puts extra weight on the instep of each foot.  Once a bunion forms, it changes the way weight is spread all across your foot. This causes the bunion to get worse over time. The big toe will bend more and more toward the other toes.  A minor bunion can be treated by:    Wearing  properly fitting shoes    Using bunion pads    Wearing shoe inserts, called orthotics, to better align the foot and ankle  Physical therapy with ultrasound or whirlpool baths can ease pain, redness, and swelling. Severe cases may require surgery. If you don t treat what is causing the bunion, it may get larger and more painful.  Home care    Limit high heels. These shoes force your foot forward, crowding the toes together.    Switch to comfortable shoes with a wide toe area. Or have your existing shoes stretched by a shoe repair shop.    Avoid shoes that are tight, narrow, or pointed.    If you are flat-footed, using arch supports may help prevent further deformity. The best shoe inserts are the ones custom made by a foot specialist, called a podiatrist, or other healthcare provider.    Put a bunion pad over the bunion to ease pressure of your shoe against the bunion. You can buy these pads at most pharmacies without a prescription    To reduce pain and swelling, apply an ice pack over the injured area for 15 to 20 minutes. Do this every 1 to 2 hours the first day. Keep using ice 3 to 4 times a day until the pain and swelling goes away.    To make an ice pack, put ice cubes in a sealed zip-lock plastic bag. Wrap the bag in a clean, thin towel or cloth. Never put ice or an ice pack directly on the skin.    You may use over-the-counter pain medicine to control pain, unless another medicine was prescribed. Talk with your provider before using these medicines if you have chronic liver or kidney disease, or ever had a stomach ulcer or GI (gastrointestinal) bleeding.  Follow-up care  Follow up with a podiatrist or foot doctor, or as advised.  If X-rays were taken, you will be notified of any new findings that may affect your care.  When to seek medical care  Contact your healthcare provider if any of the following occur:    Increasing pain or redness around the base of the big toe    Painful ingrown toenail, with redness  and swelling or pus around the nail  Date Last Reviewed: 11/21/2015 2000-2017 The [employer]. [address] [place] [address], [place] [address]. All rights reserved. This information is not intended as a substitute for professional medical care. Always follow your healthcare professional's instructions.

## 2017-12-01 NOTE — PROGRESS NOTES

## 2017-12-01 NOTE — PROGRESS NOTES
"  SUBJECTIVE:   Lance Ramos is a 47 year old male who presents to clinic today for the following health issues:      Chief Complaint   Patient presents with     Consult     patient had a recent flare up of gout in feet; wants to discuss medication     His symptoms have improved since starting the colchicine.  He noticed immediate relief once he start on the colchicine.      He did not tolerate indomethacin (developed headaches) and it did not help at all with his pain.      He plays sports and is worried about how this will affect his ability to participate in sports.      Has had approximately 3-4 episodes similar to this in the past and most recently had a flare-up of pain late October 2017.        He had ankle surgery about 5 years ago and since then has had these recurrent attacks of pain in both great toes.  He gets a tingling sensation in toes and will have swelling and difficulty moving his toes due to the swelling.  Toe is very sensitive to the touch at this time.    Playing hockey seems to trigger the pain (typically about 2-3 days after playing he has had these \"attacks\").          Problem list and histories reviewed & adjusted, as indicated.  Additional history: as documented    Patient Active Problem List   Diagnosis     CARDIOVASCULAR SCREENING; LDL GOAL LESS THAN 160     Mixed hyperlipidemia     Acute idiopathic gout of right foot     Past Surgical History:   Procedure Laterality Date     ARTHROSCOPY ANKLE  3/28/2012    Procedure:ARTHROSCOPY ANKLE; ANKLE ARTHROSCOPY, DEBRIDEMENT, LOOSE BODY EXCISION  (C-ARM); Surgeon:SOPHY BRAUN; Location:[facility]     ARTHROSCOPY ANKLE, OPEN REPAIR TENDON, COMBINED  7/23/2012    Procedure: COMBINED ARTHROSCOPY ANKLE, OPEN REPAIR TENDON;  LEFT ANKLE ARTHROSCOPY AND BROSTROM REPAIR ;  Surgeon: Sophy Braun MD;  Location: [facility]     ENT SURGERY      EPISTAXIS POSTERIOR SURGERY     LASIK       REPAIR TENDON FOOT  7/23/2012    Procedure: REPAIR TENDON FOOT;  " "Brostrom Repair;  Surgeon: Sophy Braun MD;  Location:  [facility]     VASECTOMY         Social History   Substance Use Topics     Smoking status: Never Smoker     Smokeless tobacco: Never Used     Alcohol use Yes      Comment: 0 - 2 PER WEEK     History reviewed. No pertinent family history.      Current Outpatient Prescriptions   Medication Sig Dispense Refill     predniSONE (DELTASONE) 20 MG tablet Take 2 tablets (40 mg) by mouth daily for 5 days 10 tablet 0     IBUPROFEN PO        BP Readings from Last 3 Encounters:   12/01/17 131/82   10/25/17 134/84   03/16/17 116/86    Wt Readings from Last 3 Encounters:   12/01/17 211 lb (95.7 kg)   10/25/17 216 lb 6.4 oz (98.2 kg)   03/16/17 208 lb 9.6 oz (94.6 kg)                        Reviewed and updated as needed this visit by clinical staffTobacco  Allergies  Meds  Med Hx  Surg Hx  Fam Hx  Soc Hx      Reviewed and updated as needed this visit by Provider         ROS:  Constitutional, HEENT, cardiovascular, pulmonary, gi and gu systems are negative, except as otherwise noted.      OBJECTIVE:   /82  Pulse 88  Temp 96.6  F (35.9  C) (Tympanic)  Ht 5' 6\" (1.676 m)  Wt 211 lb (95.7 kg)  BMI 34.06 kg/m2  Body mass index is 34.06 kg/(m^2).  GENERAL: healthy, alert and no distress  Foot examination performed.  No lesions or skin breakdown noted.   Dorsalis pedis and posterior tibialis pulses intact bilaterally.  Bunion formation noted bilaterally.  Full ROM of toes and ankle.  Gait normal.  No swelling, warmth or redness today.     Diagnostic Test Results:  none     ASSESSMENT/PLAN:         1. Pain in toes of both feet  With the acute onset of great toe pain, gout is still high in my differential.  He did respond to the colchicine finally, however this improvement may have just been due to rest and time.  I suggest we recheck a uric acid level and also a 24 hour urine to see if those provide some direction to these pain flare-ups.  Checked BMP today to " serve as a baseline if we decide to start allopurinol on a preventative basis.     I am not convinced however, that gout is fully responsible for these pain flare-ups especially after discussing his history in more detail.  These symptoms very well could be due to his bunions and the tight fitting hockey skates are triggering the swelling/pain.  I suggest he meet with a podiatrist to review.    - Uric acid timed urine with Creat Ratio; Future  - Uric acid  - Basic metabolic panel  - predniSONE (DELTASONE) 20 MG tablet; Take 2 tablets (40 mg) by mouth daily for 5 days  Dispense: 10 tablet; Refill: 0  - ORTHO  REFERRAL    2 Gout involving toe, unspecified cause/chronicity   Repeat uric acid level elevated, I suggest starting allopurinol for gout prophylaxis.      We discussed the pathophysiology of Gout and outlined the expected course.  Patient is to limit alcohol, meats and high purine foods.  Patient education materials on gout were given to patient.     I discussed the warning symptoms and signs that indicate an atypical course that would need urgent follow up including findings that would require a call to 911. I also discussed strategies that could provide symptomatic relief.      Baseline BMP obtained today and renal function stable.      For gout flares, will use prednisone burst instead (as indomethacin did not work well).         2. Bilateral bunions  F/u with podiatry to discuss bunions.   - ORTHO  REFERRAL    Flu shot done.      CONSULTATION/REFERRAL to podiatry.    Mona Lockwood PA-C  [facility]

## 2017-12-01 NOTE — MR AVS SNAPSHOT
After Visit Summary   12/1/2017    Lance Ramos    MRN: [id number]           Patient Information     Date Of Birth          1970        Visit Information        Provider Department      12/1/2017 2:20 PM Mona Lockwood PA-C [facility]        Today's Diagnoses     Acute idiopathic gout of right foot    -  1    Bilateral bunions          Care Instructions      I am considering two causes for your foot pain.  It's either gout, which we will recheck the uric acid level and a 24 hour urine excretion level or it may be due to bunions (see below).     I have placed a referral to a podiatrist, I would like you to meet with them to discuss the bunions in more detail.    Call the following number for that ~ [phone]      Gout Diet  Gout is a painful condition caused by an excess of uric acid, a waste product made by the body. Uric acid forms crystals that collect in the joints. The immune response to these crystals brings on symptoms of joint pain and swelling. This is called a gout attack. Often, medications and diet changes are combined to manage gout. Below are some guidelines for changing your diet to help you manage gout and prevent attacks. Your health care provider will help you determine the best eating plan for you.     Eating to manage gout  Weight loss for those who are overweight may help reduce gout attacks.  Eat less of these foods  Eating too many foods containing purines may raise the levels of uric acid in your body. This raises your risk for a gout attack. Try to limit these foods and drinks:    Alcohol, such as beer and red wine. You may be told to avoid alcohol completely.    Soft drinks that contain sugar or high fructose corn syrup    Certain fish, including anchovies, sardines, fish eggs, and herring    Shellfish    Certain meats, such as red meat, hot dogs, luncheon meats, and turkey    Organ meats, such as liver, kidneys, and sweetbreads    Legumes,  such as dried beans and peas    Other high fat foods such as gravy, whole milk, and high fat cheeses    Vegetables such as asparagus, cauliflower, spinach, and mushrooms used to be thought to contribute to an increased risk for a gout attack, but recent studies show that high purine vegetables don't increase the risk for a gout attack.  Eat more of these foods  Other foods may be helpful for people with gout. Add some of these foods to your diet:    Cherries contain chemicals that may lower uric acid.    Omega fatty acids. These are found in some fatty fish such as salmon, certain oils (flax, olive, or nut), and nuts themselves. Omega fatty acids may help prevent inflammation due to gout.    Dairy products that are low-fat or fat-free, such as cheese and yogurt    Complex carbohydrate foods, including whole grains, brown rice, oats, and beans    Coffee, in moderation    Water, approximately 64 ounces per day  Follow-up care  Follow up with your healthcare provider as advised.  When to seek medical advice  Call your healthcare provider right away if any of these occur:    Return of gout symptoms, usually at night:    Severe pain, swelling, and heat in a joint, especially the base of the big toe    Affected joint is hard to move    Skin of the affected joint is purple or red    Fever of 100.4 F (38 C) or higher    Pain that doesn't get better even with prescribed medicine   Date Last Reviewed: 1/12/2016 2000-2017 [employer]. [address] [place] [address]. All rights reserved. This information is not intended as a substitute for professional medical care. Always follow your healthcare professional's instructions.          Bunion    You have a bunion. This is a bony bump at the base of your big toe, along the inside edge of your foot. As the bump gets bigger, it can become red, swollen, and painful with shoe wear.  Bunions may occur if you wear shoes that are too tight and pinch your toes  together. High heels may make this worse. In some cases a bunion is due to poor alignment of the foot and ankle. This puts extra weight on the instep of each foot.  Once a bunion forms, it changes the way weight is spread all across your foot. This causes the bunion to get worse over time. The big toe will bend more and more toward the other toes.  A minor bunion can be treated by:    Wearing properly fitting shoes    Using bunion pads    Wearing shoe inserts, called orthotics, to better align the foot and ankle  Physical therapy with ultrasound or whirlpool baths can ease pain, redness, and swelling. Severe cases may require surgery. If you don t treat what is causing the bunion, it may get larger and more painful.  Home care    Limit high heels. These shoes force your foot forward, crowding the toes together.    Switch to comfortable shoes with a wide toe area. Or have your existing shoes stretched by a shoe repair shop.    Avoid shoes that are tight, narrow, or pointed.    If you are flat-footed, using arch supports may help prevent further deformity. The best shoe inserts are the ones custom made by a foot specialist, called a podiatrist, or other healthcare provider.    Put a bunion pad over the bunion to ease pressure of your shoe against the bunion. You can buy these pads at most pharmacies without a prescription    To reduce pain and swelling, apply an ice pack over the injured area for 15 to 20 minutes. Do this every 1 to 2 hours the first day. Keep using ice 3 to 4 times a day until the pain and swelling goes away.    To make an ice pack, put ice cubes in a sealed zip-lock plastic bag. Wrap the bag in a clean, thin towel or cloth. Never put ice or an ice pack directly on the skin.    You may use over-the-counter pain medicine to control pain, unless another medicine was prescribed. Talk with your provider before using these medicines if you have chronic liver or kidney disease, or ever had a stomach ulcer  or GI (gastrointestinal) bleeding.  Follow-up care  Follow up with a podiatrist or foot doctor, or as advised.  If X-rays were taken, you will be notified of any new findings that may affect your care.  When to seek medical care  Contact your healthcare provider if any of the following occur:    Increasing pain or redness around the base of the big toe    Painful ingrown toenail, with redness and swelling or pus around the nail  Date Last Reviewed: 11/21/2015 2000-2017 [employer]. [address] [place] [address]. All rights reserved. This information is not intended as a substitute for professional medical care. Always follow your healthcare professional's instructions.                Follow-ups after your visit        Additional Services     ORTHO  REFERRAL       [facility] is referring you to the Orthopedic  Services at [facility].       The  Representative will assist you in the coordination of your Orthopedic and Musculoskeletal Care as prescribed by your physician.    The  Representative will call you within 1 business day to help schedule your appointment, or you may contact the  Representative at:    All areas ~ [phone]     Type of Referral : [facility] / [facility]       Timeframe requested: Routine    Coverage of these services is subject to the terms and limitations of your health insurance plan.  Please call member services at your health plan with any benefit or coverage questions.      If X-rays, CT or MRI's have been performed, please contact the facility where they were done to arrange for , prior to your scheduled appointment.  Please bring this referral request to your appointment and present it to your specialist.                  Future tests that were ordered for you today     Open Future Orders        Priority Expected Expires Ordered    Uric acid timed  "urine with Creat Ratio Routine  12/31/2017 12/1/2017            Who to contact     Normal or non-critical lab and imaging results will be communicated to you by [employer], letter or phone within 4 business days after the clinic has received the results. If you do not hear from us within 7 days, please contact the clinic through [employer] or phone. If you have a critical or abnormal lab result, we will notify you by phone as soon as possible.  Submit refill requests through [employer] or call your pharmacy and they will forward the refill request to us. Please allow 3 business days for your refill to be completed.          If you need to speak with a  for additional information , please call: [phone]           Additional Information About Your Visit        [employer] Information     [employer] gives you secure access to your electronic health record. If you see a primary care provider, you can also send messages to your care team and make appointments. If you have questions, please call your primary care clinic.  If you do not have a primary care provider, please call [phone] and they will assist you.        Care EveryWhere ID     This is your Care EveryWhere ID. This could be used by other organizations to access your [facility] medical records  [id number]        Your Vitals Were     Pulse Temperature Height BMI (Body Mass Index)          88 96.6  F (35.9  C) (Tympanic) 5' 6\" (1.676 m) 34.06 kg/m2         Blood Pressure from Last 3 Encounters:   12/01/17 131/82   10/25/17 134/84   03/16/17 116/86    Weight from Last 3 Encounters:   12/01/17 211 lb (95.7 kg)   10/25/17 216 lb 6.4 oz (98.2 kg)   03/16/17 208 lb 9.6 oz (94.6 kg)              We Performed the Following     Basic metabolic panel     ORTHO  REFERRAL     Uric acid          Today's Medication Changes          These changes are accurate as of: 12/1/17  3:01 PM.  If you have any questions, ask your nurse or doctor.               Start " taking these medicines.        Dose/Directions    predniSONE 20 MG tablet   Commonly known as:  DELTASONE   Used for:  Acute idiopathic gout of right foot   Started by:  Mona Lockwood PA-C        Dose:  40 mg   Take 2 tablets (40 mg) by mouth daily for 5 days   Quantity:  10 tablet   Refills:  0            Where to get your medicines      These medications were sent to [id number] [place] [address] [place] AT [address] [place], [place] [address]     Phone:  [phone]     predniSONE 20 MG tablet                Primary Care Provider Office Phone # Fax #    Blanca Merritt [phone] [address] [place] [address]        Equal Access to Services     JORDAN BETTENCOURT : Hadii jonathan ku hadasho Soomaali, waaxda luqadaha, qaybta kaalmada adeegyada, waxay kekein hayaan vargas andrade . So [facility] [phone].    ATENCIÓN: Si habla español, tiene a renee disposición servicios gratuitos de asistencia lingüística. Llame al [phone].    We comply with applicable federal civil rights laws and [place] laws. We do not discriminate on the basis of race, color, national origin, age, disability, sex, sexual orientation, or gender identity.            Thank you!     Thank you for choosing [facility]  for your care. Our goal is always to provide you with excellent care. Hearing back from our patients is one way we can continue to improve our services. Please take a few minutes to complete the written survey that you may receive in the mail after your visit with us. Thank you!             Your Updated Medication List - Protect others around you: Learn how to safely use, store and throw away your medicines at www.disposemymeds.org.          This list is accurate as of: 12/1/17  3:01 PM.  Always use your most recent med list.                   Brand Name Dispense Instructions for use Diagnosis    IBUPROFEN  PO           predniSONE 20 MG tablet    DELTASONE    10 tablet    Take 2 tablets (40 mg) by mouth daily for 5 days    Acute idiopathic gout of right foot

## 2017-12-02 LAB
ANION GAP SERPL CALCULATED.3IONS-SCNC: 8 MMOL/L (ref 3–14)
BUN SERPL-MCNC: 11 MG/DL (ref 7–30)
CALCIUM SERPL-MCNC: 9.3 MG/DL (ref 8.5–10.1)
CHLORIDE SERPL-SCNC: 99 MMOL/L (ref 94–109)
CO2 SERPL-SCNC: 29 MMOL/L (ref 20–32)
CREAT SERPL-MCNC: 1.02 MG/DL (ref 0.66–1.25)
GFR SERPL CREATININE-BSD FRML MDRD: 78 ML/MIN/1.7M2
GLUCOSE SERPL-MCNC: 95 MG/DL (ref 70–99)
POTASSIUM SERPL-SCNC: 3.9 MMOL/L (ref 3.4–5.3)
SODIUM SERPL-SCNC: 136 MMOL/L (ref 133–144)
URATE SERPL-MCNC: 8.2 MG/DL (ref 3.5–7.2)

## 2017-12-03 PROCEDURE — 84560 ASSAY OF URINE/URIC ACID: CPT | Performed by: PHYSICIAN ASSISTANT

## 2017-12-03 PROCEDURE — 81050 URINALYSIS VOLUME MEASURE: CPT | Performed by: PHYSICIAN ASSISTANT

## 2017-12-04 DIAGNOSIS — M79.675 PAIN IN TOES OF BOTH FEET: ICD-10-CM

## 2017-12-04 DIAGNOSIS — M79.674 PAIN IN TOES OF BOTH FEET: ICD-10-CM

## 2017-12-04 PROBLEM — M10.9 GOUT INVOLVING TOE, UNSPECIFIED CAUSE, UNSPECIFIED CHRONICITY, UNSPECIFIED LATERALITY: Status: ACTIVE | Noted: 2017-12-04

## 2017-12-04 PROBLEM — M10.9 ACUTE GOUTY ARTHRITIS: Status: ACTIVE | Noted: 2017-12-04

## 2017-12-04 LAB
COLLECT DURATION TIME UR: 24 H
CREAT 24H UR-MRATE: 2.01 G/(24.H) (ref 1–2)
CREAT UR-MCNC: 106 MG/DL
SPECIMEN VOL UR: 1900 ML

## 2017-12-04 RX ORDER — ALLOPURINOL 100 MG/1
TABLET ORAL
Qty: 60 TABLET | Refills: 0 | Status: SHIPPED | OUTPATIENT
Start: 2017-12-04 | End: 2018-01-11

## 2017-12-04 RX ORDER — ALLOPURINOL 100 MG/1
100 TABLET ORAL DAILY
Qty: 45 TABLET | Refills: 0 | Status: CANCELLED | OUTPATIENT
Start: 2017-12-04

## 2017-12-05 LAB
URATE 24H UR-MRATE: 0.33 G/G CR
URATE UR-MCNC: NORMAL MG/DL

## 2018-01-11 ENCOUNTER — TELEPHONE (OUTPATIENT)
Dept: FAMILY MEDICINE | Facility: CLINIC | Age: 48
End: 2018-01-11

## 2018-01-11 DIAGNOSIS — M10.9 GOUT INVOLVING TOE, UNSPECIFIED CAUSE, UNSPECIFIED CHRONICITY, UNSPECIFIED LATERALITY: ICD-10-CM

## 2018-01-11 RX ORDER — ALLOPURINOL 100 MG/1
TABLET ORAL
Qty: 60 TABLET | Refills: 0 | Status: SHIPPED | OUTPATIENT
Start: 2018-01-11 | End: 2018-03-13

## 2018-01-11 NOTE — TELEPHONE ENCOUNTER
Reason for call: Patient is calling with question in regards to zyloprim wondering if  could refill that medication or does he need an appoinement. Please advise    Phone number to reach patient:  Cell number on file:    Telephone Information:   Mobile [phone]       Best Time:  anytime    Can we leave a detailed message on this number?  YES

## 2018-01-12 NOTE — TELEPHONE ENCOUNTER
Please call patient, I have sent a pradip refill of his zyloprim (allopurinol), but he is due to recheck his uric acid level for further refills (lab only appt, non-fasting).    Mona Lockwood PA-C

## 2018-03-13 DIAGNOSIS — M10.9 GOUT INVOLVING TOE, UNSPECIFIED CAUSE, UNSPECIFIED CHRONICITY, UNSPECIFIED LATERALITY: ICD-10-CM

## 2018-03-13 NOTE — TELEPHONE ENCOUNTER
Requested Prescriptions   Pending Prescriptions Disp Refills     allopurinol (ZYLOPRIM) 100 MG tablet 60 tablet 0     Si tab daily x 1 week then 2 tabs daily    There is no refill protocol information for this order        Last Written Prescription Date:  18  Last Fill Quantity: 60,  # refills: 0   Last office visit: 2017 with prescribing provider:  17   Future Office Visit:   Next 5 appointments (look out 90 days)     Mar 14, 2018  7:40 AM CDT   SHORT with Mona Lockwood PA-C   [facility] ([facility])    [address] [address] [phone]

## 2018-03-14 DIAGNOSIS — M10.9 GOUT INVOLVING TOE, UNSPECIFIED CAUSE, UNSPECIFIED CHRONICITY, UNSPECIFIED LATERALITY: ICD-10-CM

## 2018-03-14 DIAGNOSIS — M10.9 ACUTE GOUTY ARTHRITIS: Primary | ICD-10-CM

## 2018-03-14 LAB — URATE SERPL-MCNC: 6.8 MG/DL (ref 3.5–7.2)

## 2018-03-14 PROCEDURE — 36415 COLL VENOUS BLD VENIPUNCTURE: CPT | Performed by: PHYSICIAN ASSISTANT

## 2018-03-14 PROCEDURE — 84550 ASSAY OF BLOOD/URIC ACID: CPT | Performed by: PHYSICIAN ASSISTANT

## 2018-03-14 RX ORDER — ALLOPURINOL 100 MG/1
TABLET ORAL
Qty: 60 TABLET | Refills: 5 | Status: SHIPPED | OUTPATIENT
Start: 2018-03-14 | End: 2018-03-15

## 2018-03-14 NOTE — TELEPHONE ENCOUNTER
Prescription approved per [facility] Protocol or patient Primary care provider (PCP)  SYLVIE Guerrero RN/Albino Neri

## 2018-03-15 RX ORDER — ALLOPURINOL 100 MG/1
200 TABLET ORAL DAILY
Qty: 60 TABLET | Refills: 5 | Status: SHIPPED | OUTPATIENT
Start: 2018-03-15 | End: 2018-12-10

## 2018-10-15 ENCOUNTER — TELEPHONE (OUTPATIENT)
Dept: FAMILY MEDICINE | Facility: CLINIC | Age: 48
End: 2018-10-15

## 2018-10-15 DIAGNOSIS — M10.071 ACUTE IDIOPATHIC GOUT OF RIGHT FOOT: ICD-10-CM

## 2018-10-15 RX ORDER — COLCHICINE 0.6 MG/1
TABLET ORAL
Qty: 15 TABLET | Refills: 0 | Status: SHIPPED | OUTPATIENT
Start: 2018-10-15 | End: 2018-12-10

## 2018-10-15 NOTE — TELEPHONE ENCOUNTER
Patient needs this med filled today if possible please.    Thank you  Albino Berg [facility]

## 2018-10-15 NOTE — TELEPHONE ENCOUNTER
"Requested Prescriptions   Pending Prescriptions Disp Refills     colchicine (COLCRYS) 0.6 MG tablet 15 tablet 0    Last Written Prescription Date:  10/31/2017 #15 x 0  Last filled 10/31/2017  Last office visit: 2017 SANDRA Lockwood   Future Office Visit: None     Note: medication is not on the current med list    Si tabs at the onset of flare, then 1 tab 2 hours later if pain persists.    Gout Agents Protocol Failed    10/15/2018  2:41 PM       Failed - ALT on file in past 12 months    No lab results found.         Failed - Has Uric Acid on file in past 12 months and value is less than 6    Recent Labs   Lab Test  18   0801   URIC  6.8     If level is 6mg/dL or greater, ok to refill one time and refer to provider.          Passed - CBC on file in past 12 months    Recent Labs   Lab Test  10/25/17   1436   WBC  9.5   RBC  4.98   HGB  15.6   HCT  45.4   PLT  252       For [facility] ONLY: EILC248 = WBC, LSOB674 = RBC         Passed - Recent (12 mo) or future (30 days) visit within the authorizing provider's specialty    Patient had office visit in the last 12 months or has a visit in the next 30 days with authorizing provider or within the authorizing provider's specialty.  See \"Patient Info\" tab in inbasket, or \"Choose Columns\" in Meds & Orders section of the refill encounter.           Passed - Patient is age 18 or older       Passed - Normal serum creatinine on file in the past 12 months    Recent Labs   Lab Test  17   1504   CR  1.02               "

## 2018-10-16 NOTE — TELEPHONE ENCOUNTER
Please call patient, due for recheck for further refills, please assist patient in scheduling appt (annual physical exam).  Kaur refill given.       Mona Lockwood PA-C

## 2018-12-10 ENCOUNTER — OFFICE VISIT (OUTPATIENT)
Dept: FAMILY MEDICINE | Facility: CLINIC | Age: 48
End: 2018-12-10
Payer: COMMERCIAL

## 2018-12-10 VITALS
WEIGHT: 214.2 LBS | TEMPERATURE: 98.5 F | BODY MASS INDEX: 34.42 KG/M2 | HEIGHT: 66 IN | SYSTOLIC BLOOD PRESSURE: 132 MMHG | RESPIRATION RATE: 20 BRPM | DIASTOLIC BLOOD PRESSURE: 88 MMHG | HEART RATE: 100 BPM

## 2018-12-10 DIAGNOSIS — Z12.11 SPECIAL SCREENING FOR MALIGNANT NEOPLASMS, COLON: Primary | ICD-10-CM

## 2018-12-10 DIAGNOSIS — M10.071 ACUTE IDIOPATHIC GOUT OF RIGHT FOOT: ICD-10-CM

## 2018-12-10 PROCEDURE — 99213 OFFICE O/P EST LOW 20 MIN: CPT | Performed by: NURSE PRACTITIONER

## 2018-12-10 RX ORDER — ALLOPURINOL 300 MG/1
300 TABLET ORAL DAILY
Qty: 90 TABLET | Refills: 1 | Status: SHIPPED | OUTPATIENT
Start: 2018-12-10 | End: 2019-01-17

## 2018-12-10 RX ORDER — COLCHICINE 0.6 MG/1
TABLET ORAL
Qty: 15 TABLET | Refills: 2 | Status: SHIPPED | OUTPATIENT
Start: 2018-12-10 | End: 2019-01-17

## 2018-12-10 RX ORDER — COLCHICINE 0.6 MG/1
TABLET ORAL
Qty: 15 TABLET | Refills: 0 | Status: SHIPPED | OUTPATIENT
Start: 2018-12-10 | End: 2018-12-10

## 2018-12-10 ASSESSMENT — ENCOUNTER SYMPTOMS
HEADACHES: 0
SORE THROAT: 0
SINUS PRESSURE: 0
RHINORRHEA: 0
VOMITING: 0
FEVER: 0
SHORTNESS OF BREATH: 0
DIARRHEA: 0
NAUSEA: 0
WHEEZING: 0
NUMBNESS: 0
COUGH: 0
EYE DISCHARGE: 0
ARTHRALGIAS: 1
DIAPHORESIS: 0
FATIGUE: 0

## 2018-12-10 ASSESSMENT — PAIN SCALES - GENERAL: PAINLEVEL: EXTREME PAIN (9)

## 2018-12-10 ASSESSMENT — MIFFLIN-ST. JEOR: SCORE: 1770.38

## 2018-12-10 NOTE — PATIENT INSTRUCTIONS
These are general instructions and may not be specific to you. Please call, email or follow up if you have any questions or concerns.     Patient Education     Gout    Gout is an inflammation of a joint due to a build-up of gout crystals in the joint fluid. This occurs when there is an excess of uric acid (a normal waste product) in the body. Uric acid builds up in the body when the kidneys are unable to filter enough of it from the blood. This may occur with age. It is also associated with kidney disease. Gout occurs more often in people with obesity, diabetes, high blood pressure, or high levels of fats in the blood. It may run in families. Gout tends to come and go. A flare up of gout is called an attack. Drinking alcohol or eating certain foods (such as shellfish or foods with additives such as high-fructose corn syrup) may increase uric acid levels in the blood and cause a gout attack.  During a gout attack, the affected joint may become a hot, red, swollen and painful. If you have had one attack of gout, you are likely to have another. An attack of gout can be treated with medicine. If these attacks become frequent, a daily medicine may be prescribed to help the kidneys remove uric acid from the body.  Home care  During a gout attack:    Rest painful joints. If gout affects the joints of your foot or leg, you may want to use crutches for the first few days to keep from bearing weight on the affected joint.    When sitting or lying down, raise the painful joint to a level higher than your heart.    Apply an ice pack (ice cubes in a plastic bag wrapped in a thin towel) over the injured area for 20 minutes every 1 to 2 hours the first day for pain relief. Continue this 3 to 4 times a day for swelling and pain.    Avoid alcohol and foods listed below (see Preventing attacks) during a gout attack. Drink extra fluid to help flush the uric acid through your kidneys.    If you were prescribed a medicine to treat gout,  take it as your healthcare provider has instructed. Don't skip doses.    Take anti-inflammatory medicine as directed.     If pain medicines have been prescribed, take them exactly as directed.    Preventing attacks    Minimize or avoid alcohol use. Excess alcohol intake can cause a gout attack.    Limit these foods and beverages:  ? Organ meats, such as kidneys and liver  ? Certain seafoods (anchovies, sardines, shrimp, scallops, herring, mackerel)  ? Wild game, meat extracts and meat gravies  ? Foods and beverages sweetened with high-fructose corn syrup, such as sodas    Eat a healthy diet including low-fat and nonfat dairy, whole grains, and vegetables.    If you are overweight, talk to your healthcare provider about a weight reduction plan. Avoid fasting or extreme low calorie diets (less than 900 calories per day). This will increase uric acid levels in the body.    If you have diabetes or high blood pressure, work with your doctor to manage these conditions.    Protect the joint from injury. Trauma can trigger a gout attack.  Follow-up care  Follow up with your healthcare provider, or as advised.   When to seek medical advice  Call your healthcare provider if you have any of the following:    Fever over 100.4 F (38. C) with worsening joint pain    Increasing redness around the joint    Pain developing in another joint    Repeated vomiting, abdominal pain, or blood in the vomit or stool (black or red color)  Date Last Reviewed: 3/1/2017    5579-4936 [employer]. [address] [place] [address], [place] [address]. All rights reserved. This information is not intended as a substitute for professional medical care. Always follow your healthcare professional's instructions.

## 2018-12-10 NOTE — PROGRESS NOTES
SUBJECTIVE:   Lance Ramos is a 48 year old male who presents to clinic today for the following health issues:      Medication Followup of allopurinol 100 mg    Taking Medication as prescribed: NO-only took as needed when first prescribed in March. Now is taking 3 tablets daily.     Side Effects:  None    Medication Helping Symptoms:  yes     Medication Followup of colchicine 0.6 mg    Taking Medication as prescribed: Currently out of mediation. Currently has pain, swelling and redness to right ankle. Pain is sharp and throbbing.  Sleeping is difficult. Pain started over the weekend. Took oxycodone last night from a family member and it helped.     Side Effects:  None    Medication Helping Symptoms:  Yes when he takes it, needs refill       Problem list and histories reviewed & adjusted, as indicated.  Additional history: as documented    Current Outpatient Medications   Medication Sig Dispense Refill     allopurinol (ZYLOPRIM) 300 MG tablet Take 1 tablet (300 mg) by mouth daily 90 tablet 1     colchicine (COLCRYS) 0.6 MG tablet 2 tabs at the onset of flare, then 1 tab 2 hours later if pain persists.  Needs to be seen by provider for further refills 15 tablet 2     IBUPROFEN PO        No Known Allergies    Reviewed and updated as needed this visit by clinical staff  Tobacco  Allergies  Meds  Med Hx  Surg Hx  Fam Hx  Soc Hx      Reviewed and updated as needed this visit by Provider          Here today for pain to right ankle.  Has had gout multiple times in the past.  Previously had been started on allopurinol 200 mg.  Usually has some colchicine at home for gout flares, but ran out. Friday is when it started. No injury to ankle that is aware of. Pain is to right ankle. Has been taking 3 tabs of allopurinol for the last 3 weeks. Previous to this last time last flare up was in early Nov.     ROS:  Review of Systems   Constitutional: Negative for diaphoresis, fatigue and fever.   HENT: Negative for  "congestion, ear pain, rhinorrhea, sinus pressure and sore throat.    Eyes: Negative for discharge.   Respiratory: Negative for cough, shortness of breath and wheezing.    Cardiovascular: Negative for chest pain.   Gastrointestinal: Negative for diarrhea, nausea and vomiting.   Musculoskeletal: Positive for arthralgias (right ankle).   Neurological: Negative for numbness and headaches.         OBJECTIVE:     /88 (BP Location: Right arm, Patient Position: Sitting, Cuff Size: Adult Large)   Pulse 100   Temp 98.5  F (36.9  C) (Tympanic)   Resp 20   Ht 1.67 m (5' 5.75\")   Wt 97.2 kg (214 lb 3.2 oz)   BMI 34.84 kg/m    Body mass index is 34.84 kg/m .  Physical Exam   Constitutional: He appears well-developed and well-nourished.   Cardiovascular: Normal rate, regular rhythm and normal heart sounds.   Pulmonary/Chest: Effort normal and breath sounds normal.   Musculoskeletal:        Feet:    Neurological: He is alert.   Skin: Skin is warm and dry.       ASSESSMENT/PLAN:   1. Special screening for malignant neoplasms, colon  Order placed, plans to call per self and set up  - GASTROENTEROLOGY ADULT REF PROCEDURE ONLY    2. Acute idiopathic gout of right foot  We will plan to increase allopurinol from 200 mg daily to 300 mg orally daily.  Refill of colchicine given.  Reinforced foods and drink that may increase reoccurrence of gout.  Notify if no improvement.  - allopurinol (ZYLOPRIM) 300 MG tablet; Take 1 tablet (300 mg) by mouth daily  Dispense: 90 tablet; Refill: 1  - colchicine (COLCRYS) 0.6 MG tablet; 2 tabs at the onset of flare, then 1 tab 2 hours later if pain persists.  Needs to be seen by provider for further refills  Dispense: 15 tablet; Refill: 2  - CBC with platelets differential        SHIVA Otero [facility]"

## 2018-12-31 ENCOUNTER — TELEPHONE (OUTPATIENT)
Dept: FAMILY MEDICINE | Facility: CLINIC | Age: 48
End: 2018-12-31

## 2018-12-31 DIAGNOSIS — M10.071 ACUTE IDIOPATHIC GOUT OF RIGHT FOOT: ICD-10-CM

## 2018-12-31 NOTE — TELEPHONE ENCOUNTER
Reason for Call:  Medication or medication refill:    Do you use a [facility]?  Name of the pharmacy and phone number for the current request:  [email or web address]    Name of the medication requested: colchicine (COLCRYS) 0.6 MG tablet    Other request: call when refilled     Can we leave a detailed message on this number? YES    Phone number patient can be reached at: Cell number on file:    Telephone Information:   Mobile [phone]       Best Time: anytime     Call taken on 12/31/2018 at 7:30 AM by Linda Garber

## 2018-12-31 NOTE — TELEPHONE ENCOUNTER
Spoke with patient  He is having another gout flare , RT foot ankle and toes, review chart advised has refill at pharmacy to call them for refill  May call back as need  SYLVIE Freeman  Clinic  RN/Albino Neri

## 2019-01-07 DIAGNOSIS — M25.571 CHRONIC PAIN OF RIGHT ANKLE: Primary | ICD-10-CM

## 2019-01-07 DIAGNOSIS — G89.29 CHRONIC PAIN OF RIGHT ANKLE: Primary | ICD-10-CM

## 2019-01-17 DIAGNOSIS — M10.071 ACUTE IDIOPATHIC GOUT OF RIGHT FOOT: ICD-10-CM

## 2019-01-17 RX ORDER — COLCHICINE 0.6 MG/1
0.6 TABLET ORAL DAILY
Qty: 90 TABLET | Refills: 1 | Status: SHIPPED | OUTPATIENT
Start: 2019-01-17 | End: 2020-01-08

## 2019-08-21 ENCOUNTER — TELEPHONE (OUTPATIENT)
Dept: FAMILY MEDICINE | Facility: CLINIC | Age: 49
End: 2019-08-21

## 2019-08-21 NOTE — LETTER
September 6, 2019      Lance Ramos  [id number] [facility] [address]      Dear Lance Ramos    We care about your health and have reviewed your health plan including your medical conditions, medication list, and lab results.  Based on this review, it is recommended that you follow up regarding the following health topic(s):     -Cholesterol  -Colon Cancer Screening  -Wellness (Physical) Visit     We recommend you take the following action(s):  -schedule a WELLNESS (Physical) APPOINTMENT.  We will perform the following labs: Lipids (fasting cholesterol - nothing to eat except water and/or meds for 8-10 hours).    Please call us at [phone] (or use [employer]) to address the above recommendations.     Thank you for trusting [facility] and we appreciate the opportunity to serve you.  We look forward to supporting your healthcare needs in the future.    Healthy Regards,      Your Health Care Team  [facility]

## 2019-08-21 NOTE — TELEPHONE ENCOUNTER
Panel Management Review      Patient has the following on his problem list:       IVD   ASA: FAILED    Last LDL:    Lab Results   Component Value Date    CHOL 182 10/28/2016     Lab Results   Component Value Date    HDL 37 10/28/2016     Lab Results   Component Value Date     10/28/2016     Lab Results   Component Value Date    TRIG 192 10/28/2016        Lab Results   Component Value Date    CHOLHDLRATIO 4.0 12/12/2014        Is the patient on a Statin? NO   Is the patient on Aspirin? NO                    Last three blood pressure readings:  BP Readings from Last 3 Encounters:   12/10/18 132/88   12/01/17 131/82   10/25/17 134/84        Tobacco History:     History   Smoking Status     Never Smoker   Smokeless Tobacco     Never Used         Composite cancer screening  Chart review shows that this patient is due/due soon for the following Colonoscopy/Fecal Colorectal (FIT)  Summary:    Patient is due/failing the following:     Health Maintenance Due   Topic Date Due     COLONOSCOPY  08/18/1978     HIV SCREENING  08/18/1983     PREVENTIVE CARE VISIT  12/12/2015     ZOSTER IMMUNIZATION (1 of 2) 08/18/2018     PHQ-2  01/01/2019         Action needed:   Patient needs office visit for physical with fasting labs and discuss health maintenance.    Type of outreach:    8/21/19  Phone, left message for patient to call back.  and Sent [employer] message.     8/30/19  Left message for patient to return call to clinic or review [employer].    9/6/19  Letter sent.    Questions for provider review:    None                                                                                                                                    Aparna Torres CMA       Chart routed to none.

## 2020-01-08 ENCOUNTER — OFFICE VISIT (OUTPATIENT)
Dept: FAMILY MEDICINE | Facility: CLINIC | Age: 50
End: 2020-01-08
Payer: COMMERCIAL

## 2020-01-08 VITALS
SYSTOLIC BLOOD PRESSURE: 100 MMHG | RESPIRATION RATE: 16 BRPM | HEIGHT: 66 IN | BODY MASS INDEX: 31.63 KG/M2 | WEIGHT: 196.8 LBS | DIASTOLIC BLOOD PRESSURE: 76 MMHG | HEART RATE: 76 BPM | TEMPERATURE: 95.4 F

## 2020-01-08 DIAGNOSIS — Z00.00 ROUTINE GENERAL MEDICAL EXAMINATION AT A HEALTH CARE FACILITY: Primary | ICD-10-CM

## 2020-01-08 DIAGNOSIS — Z12.11 SPECIAL SCREENING FOR MALIGNANT NEOPLASMS, COLON: ICD-10-CM

## 2020-01-08 DIAGNOSIS — Z80.0 FAMILY HISTORY OF PANCREATIC CANCER: ICD-10-CM

## 2020-01-08 DIAGNOSIS — S76.312A STRAIN OF LEFT HAMSTRING, INITIAL ENCOUNTER: ICD-10-CM

## 2020-01-08 DIAGNOSIS — E78.5 HYPERLIPIDEMIA LDL GOAL <100: Primary | ICD-10-CM

## 2020-01-08 DIAGNOSIS — M77.12 LATERAL EPICONDYLITIS OF LEFT ELBOW: ICD-10-CM

## 2020-01-08 DIAGNOSIS — Z23 NEED FOR INFLUENZA VACCINATION: ICD-10-CM

## 2020-01-08 DIAGNOSIS — Z12.5 SCREENING FOR PROSTATE CANCER: ICD-10-CM

## 2020-01-08 DIAGNOSIS — M10.071 ACUTE IDIOPATHIC GOUT OF RIGHT FOOT: ICD-10-CM

## 2020-01-08 LAB
ALBUMIN SERPL-MCNC: 4.5 G/DL (ref 3.4–5)
ALP SERPL-CCNC: 57 U/L (ref 40–150)
ALT SERPL W P-5'-P-CCNC: 42 U/L (ref 0–70)
AMYLASE SERPL-CCNC: 76 U/L (ref 30–110)
ANION GAP SERPL CALCULATED.3IONS-SCNC: 7 MMOL/L (ref 3–14)
AST SERPL W P-5'-P-CCNC: 30 U/L (ref 0–45)
BILIRUB SERPL-MCNC: 0.9 MG/DL (ref 0.2–1.3)
BUN SERPL-MCNC: 21 MG/DL (ref 7–30)
CALCIUM SERPL-MCNC: 9.7 MG/DL (ref 8.5–10.1)
CHLORIDE SERPL-SCNC: 105 MMOL/L (ref 94–109)
CHOLEST SERPL-MCNC: 271 MG/DL
CO2 SERPL-SCNC: 27 MMOL/L (ref 20–32)
CREAT SERPL-MCNC: 1.16 MG/DL (ref 0.66–1.25)
GFR SERPL CREATININE-BSD FRML MDRD: 72 ML/MIN/{1.73_M2}
GLUCOSE SERPL-MCNC: 89 MG/DL (ref 70–99)
HDLC SERPL-MCNC: 52 MG/DL
LDLC SERPL CALC-MCNC: 146 MG/DL
LIPASE SERPL-CCNC: 199 U/L (ref 73–393)
NONHDLC SERPL-MCNC: 219 MG/DL
POTASSIUM SERPL-SCNC: 4 MMOL/L (ref 3.4–5.3)
PROT SERPL-MCNC: 8.4 G/DL (ref 6.8–8.8)
PSA SERPL-ACNC: 1.4 UG/L (ref 0–4)
SODIUM SERPL-SCNC: 139 MMOL/L (ref 133–144)
TRIGL SERPL-MCNC: 366 MG/DL
URATE SERPL-MCNC: 9 MG/DL (ref 3.5–7.2)

## 2020-01-08 PROCEDURE — 80053 COMPREHEN METABOLIC PANEL: CPT | Performed by: NURSE PRACTITIONER

## 2020-01-08 PROCEDURE — G0103 PSA SCREENING: HCPCS | Performed by: NURSE PRACTITIONER

## 2020-01-08 PROCEDURE — 82150 ASSAY OF AMYLASE: CPT | Performed by: NURSE PRACTITIONER

## 2020-01-08 PROCEDURE — 83690 ASSAY OF LIPASE: CPT | Performed by: NURSE PRACTITIONER

## 2020-01-08 PROCEDURE — 99396 PREV VISIT EST AGE 40-64: CPT | Mod: 25 | Performed by: NURSE PRACTITIONER

## 2020-01-08 PROCEDURE — 36415 COLL VENOUS BLD VENIPUNCTURE: CPT | Performed by: NURSE PRACTITIONER

## 2020-01-08 PROCEDURE — 90471 IMMUNIZATION ADMIN: CPT | Performed by: NURSE PRACTITIONER

## 2020-01-08 PROCEDURE — 90682 RIV4 VACC RECOMBINANT DNA IM: CPT | Performed by: NURSE PRACTITIONER

## 2020-01-08 PROCEDURE — 84550 ASSAY OF BLOOD/URIC ACID: CPT | Performed by: NURSE PRACTITIONER

## 2020-01-08 PROCEDURE — 99214 OFFICE O/P EST MOD 30 MIN: CPT | Mod: 25 | Performed by: NURSE PRACTITIONER

## 2020-01-08 PROCEDURE — 80061 LIPID PANEL: CPT | Performed by: NURSE PRACTITIONER

## 2020-01-08 RX ORDER — NAPROXEN 500 MG/1
TABLET ORAL
Qty: 60 TABLET | Refills: 0 | Status: SHIPPED | OUTPATIENT
Start: 2020-01-08 | End: 2022-07-13

## 2020-01-08 RX ORDER — COLCHICINE 0.6 MG/1
0.6 TABLET ORAL DAILY PRN
Qty: 30 TABLET | Refills: 1 | Status: SHIPPED | OUTPATIENT
Start: 2020-01-08 | End: 2022-07-13

## 2020-01-08 RX ORDER — ROSUVASTATIN CALCIUM 20 MG/1
20 TABLET, COATED ORAL DAILY
Qty: 90 TABLET | Refills: 1 | Status: SHIPPED | OUTPATIENT
Start: 2020-01-08 | End: 2022-07-13

## 2020-01-08 ASSESSMENT — ENCOUNTER SYMPTOMS
DIARRHEA: 0
PALPITATIONS: 0
COUGH: 0
FREQUENCY: 0
SORE THROAT: 0
VOMITING: 0
HEADACHES: 0
RHINORRHEA: 0
MYALGIAS: 0
NAUSEA: 0
SHORTNESS OF BREATH: 0
SINUS PRESSURE: 0
DIZZINESS: 0
LIGHT-HEADEDNESS: 0
ARTHRALGIAS: 1
WHEEZING: 0
EYE DISCHARGE: 0
BRUISES/BLEEDS EASILY: 0
CONFUSION: 0
NUMBNESS: 0
DIAPHORESIS: 0
FATIGUE: 0
FEVER: 0
DYSURIA: 0
BLOOD IN STOOL: 0
CHEST TIGHTNESS: 0

## 2020-01-08 ASSESSMENT — MIFFLIN-ST. JEOR: SCORE: 1682.49

## 2020-01-08 ASSESSMENT — PAIN SCALES - GENERAL: PAINLEVEL: EXTREME PAIN (8)

## 2020-01-08 NOTE — PATIENT INSTRUCTIONS
Go to the pharmacy for shingles vaccine.     No additional over the counter ibuprofen, Advil, naproxen, aleve. Ok to take Tylenol or acetaminophen.       Preventive Health Recommendations  Male Ages 50 - 64    Yearly exam:             See your health care provider every year in order to  o   Review health changes.   o   Discuss preventive care.    o   Review your medicines if your doctor has prescribed any.     Have a cholesterol test every 5 years, or more frequently if you are at risk for high cholesterol/heart disease.     Have a diabetes test (fasting glucose) every three years. If you are at risk for diabetes, you should have this test more often.     Have a colonoscopy at age 50, or have a yearly FIT test (stool test). These exams will check for colon cancer.      Talk with your health care provider about whether or not a prostate cancer screening test (PSA) is right for you.    You should be tested each year for STDs (sexually transmitted diseases), if you re at risk.     Shots: Get a flu shot each year. Get a tetanus shot every 10 years.     Nutrition:    Eat at least 5 servings of fruits and vegetables daily.     Eat whole-grain bread, whole-wheat pasta and brown rice instead of white grains and rice.     Get adequate Calcium and Vitamin D.     Lifestyle    Exercise for at least 150 minutes a week (30 minutes a day, 5 days a week). This will help you control your weight and prevent disease.     Limit alcohol to one drink per day.     No smoking.     Wear sunscreen to prevent skin cancer.     See your dentist every six months for an exam and cleaning.     See your eye doctor every 1 to 2 years.

## 2020-01-08 NOTE — PROGRESS NOTES
3  SUBJECTIVE:   CC: Lance Ramos is an 49 year old male who presents for preventive health visit.     Chief Complaint   Patient presents with     Physical     pt is fasting       Healthy Habits:    Do you get at least three servings of calcium containing foods daily (dairy, green leafy vegetables, etc.)? no, taking calcium and/or vitamin D supplement: no    Amount of exercise or daily activities, outside of work: 3-6 day(s) per week    Problems taking medications regularly No    Medication side effects: No    Have you had an eye exam in the past two years? unsure    Do you see a dentist twice per year? yes    Do you have sleep apnea, excessive snoring or daytime drowsiness?yes    Left elbow pain for 8 months. Feels like pinching sharp pains.     Today's PHQ-2 Score:   PHQ-2 ( 1999 Pf[employer]) 1/8/2020 3/16/2017   Q1: Little interest or pleasure in doing things 0 0   Q2: Feeling down, depressed or hopeless 0 0   PHQ-2 Score 0 0       Abuse: Current or Past(Physical, Sexual or Emotional)- No  Do you feel safe in your environment? Yes        Social History     Tobacco Use     Smoking status: Never Smoker     Smokeless tobacco: Never Used   Substance Use Topics     Alcohol use: Yes     Comment: 0 - 2 PER WEEK     If you drink alcohol do you typically have >3 drinks per day or >7 drinks per week? No                      Last PSA: No results found for: PSA    Reviewed orders with patient. Reviewed health maintenance and updated orders accordingly - Yes  Current Outpatient Medications   Medication Sig Dispense Refill     colchicine (COLCRYS) 0.6 MG tablet Take 1 tablet (0.6 mg) by mouth daily as needed for moderate pain 30 tablet 1     IBUPROFEN PO        naproxen (NAPROSYN) 500 MG tablet Take 1 pill twice per day with food for 10 days then every 12 hours as needed 60 tablet 0     No Known Allergies    Reviewed and updated as needed this visit by clinical staff  Tobacco  Allergies  Meds  Med Hx  Surg Hx  Fam Hx   Soc Hx        Reviewed and updated as needed this visit by Provider        Left elbow pain for the last 8 months. Pain is there all the time. If is working out will sting. Will have pain to the top of it. Will throb at night and keep up at night. No numbness or tingling.     Has pain to left hamstring. Injured it years ago when was running. Had to quit jogging due to this. Thinks was about 15 years ago that first started to hurt. Currently going to [employer].     Needs to have refill colchicine. Likes to have on hand if needed. Unsure what causes flare ups. Flare ups are less than used to be because diet is better.     Last PSA: Never, no family history of prostate cancer  Last Colonoscopy: Never, no family history of colon cancer  Last eye exam: Unsure  Last dental exam: Every 6 mo  Last tetanus vaccine: 2014  Last influenza vaccine: Plans to do here today  Last shingles vaccine: has not had  Last pneumonia vaccine: N/A  Hep C screen (born [birth year]): N/A  HIV screen: Declines, low risk  AAA screen (age 65-78 with smoking hx): N/A  IVD (HTN, Hyperlipid, Smoking): N/A  Lung CA screening (55-80, 30 pk smoking hx): N/A    ROS:  Review of Systems   Constitutional: Negative for diaphoresis, fatigue and fever.   HENT: Negative for congestion, ear pain, postnasal drip, rhinorrhea, sinus pressure and sore throat.    Eyes: Negative for discharge.   Respiratory: Negative for cough, chest tightness, shortness of breath and wheezing.    Cardiovascular: Negative for chest pain and palpitations.   Gastrointestinal: Negative for blood in stool, diarrhea, nausea and vomiting.   Genitourinary: Negative for dysuria, frequency and urgency.   Musculoskeletal: Positive for arthralgias (left hamstring and left elbow pain). Negative for myalgias.        Occasional gout flares     Skin: Negative for rash.   Neurological: Negative for dizziness, light-headedness, numbness and headaches.   Hematological: Does not bruise/bleed  "easily.   Psychiatric/Behavioral: Negative for confusion.         OBJECTIVE:   /76 (BP Location: Left arm, Patient Position: Sitting, Cuff Size: Adult Large)   Pulse 76   Temp 95.4  F (35.2  C) (Tympanic)   Resp 16   Ht 1.664 m (5' 5.5\")   Wt 89.3 kg (196 lb 12.8 oz)   BMI 32.25 kg/m    EXAM:  Physical Exam  Constitutional:       Appearance: He is well-developed.   HENT:      Right Ear: Tympanic membrane and external ear normal. No middle ear effusion. Tympanic membrane is not erythematous.      Left Ear: Tympanic membrane and external ear normal.  No middle ear effusion. Tympanic membrane is not erythematous.      Mouth/Throat:      Pharynx: Uvula midline.   Eyes:      Pupils: Pupils are equal, round, and reactive to light.   Neck:      Thyroid: No thyromegaly.      Vascular: No carotid bruit.   Cardiovascular:      Rate and Rhythm: Normal rate and regular rhythm.      Pulses:           Femoral pulses are 2+ on the right side and 2+ on the left side.     Heart sounds: Normal heart sounds.   Pulmonary:      Effort: Pulmonary effort is normal.      Breath sounds: Normal breath sounds.   Abdominal:      General: Bowel sounds are normal. There is no distension.      Palpations: Abdomen is soft.      Tenderness: There is no abdominal tenderness.   Musculoskeletal: Normal range of motion.      Left elbow: He exhibits normal range of motion, no swelling and no effusion. Tenderness found. Lateral epicondyle tenderness noted. No medial epicondyle tenderness noted.        Legs:    Skin:     General: Skin is warm and dry.   Neurological:      Mental Status: He is alert.         ASSESSMENT/PLAN:   1. Routine general medical examination at a health care facility  Screening guidelines reviewed.   - Comprehensive metabolic panel  - Lipid panel reflex to direct LDL Fasting    2. Acute idiopathic gout of right foot  We will check uric acid level here today.  Refill given of colchicine to have on hand if needs.  - Uric " "acid  - colchicine (COLCRYS) 0.6 MG tablet; Take 1 tablet (0.6 mg) by mouth daily as needed for moderate pain  Dispense: 30 tablet; Refill: 1    3. Screening for prostate cancer  We will check PSA here today.  Shared decision making used regarding PSA testing.  Currently, no other symptoms.  - Prostate spec antigen screen    4. Special screening for malignant neoplasms, colon  Order placed, plans to call per self and set up  - GASTROENTEROLOGY ADULT REF PROCEDURE ONLY    5. Family history of pancreatic cancer  We will check requested labs here today  - Amylase  - Lipase    6. Need for influenza vaccination  Administered today in clinic  - INFLUENZA QUAD, RECOMBINANT, P-FREE (RIV4) (FLUBLOCK) [62057]  - VACCINE ADMINISTRATION, INITIAL    7. Strain of left hamstring, initial encounter  Course of NSAID.  Refer to physical therapy.  If no improvement will contact clinic and will plan to send to orthopedics.  - naproxen (NAPROSYN) 500 MG tablet; Take 1 pill twice per day with food for 10 days then every 12 hours as needed  Dispense: 60 tablet; Refill: 0  - [facility]; Future    8. Lateral epicondylitis of left elbow  Course of NSAID.  Refer to physical therapy.  If no improvement will contact clinic and will plan to send to orthopedics.  - naproxen (NAPROSYN) 500 MG tablet; Take 1 pill twice per day with food for 10 days then every 12 hours as needed  Dispense: 60 tablet; Refill: 0  - [facility]FERR[facility]; Future    COUNSELING:  Reviewed preventive health counseling, as reflected in patient instructions       Regular exercise       Healthy diet/nutrition       Immunizations    Vaccinated for: Influenza             Colon cancer screening       Prostate cancer screening    Estimated body mass index is 32.25 kg/m  as calculated from the following:    Height as of this encounter: 1.664 m (5' 5.5\").    Weight as of this encounter: 89.3 kg (196 lb 12.8 oz).    Weight management " plan: Discussed healthy diet and exercise guidelines     reports that he has never smoked. He has never used smokeless tobacco.      Counseling Resources:  ATP IV Guidelines  Pooled Cohorts Equation Calculator  FRAX Risk Assessment  ICSI Preventive Guidelines  Dietary Guidelines for Americans, 2010  USDA's MyPlate  ASA Prophylaxis  Lung CA Screening    SHIVA Otero [facility]

## 2020-01-09 ENCOUNTER — TELEPHONE (OUTPATIENT)
Dept: FAMILY MEDICINE | Facility: CLINIC | Age: 50
End: 2020-01-09

## 2020-01-09 NOTE — TELEPHONE ENCOUNTER
Called [facility] and spoke with the [facility], message below relayed.    Lucretia ADHIKARI RN

## 2020-02-18 ENCOUNTER — MYC MEDICAL ADVICE (OUTPATIENT)
Dept: FAMILY MEDICINE | Facility: CLINIC | Age: 50
End: 2020-02-18

## 2020-02-18 DIAGNOSIS — M77.12 LATERAL EPICONDYLITIS OF LEFT ELBOW: Primary | ICD-10-CM

## 2020-03-03 ENCOUNTER — OFFICE VISIT (OUTPATIENT)
Dept: ORTHOPEDICS | Facility: CLINIC | Age: 50
End: 2020-03-03
Payer: COMMERCIAL

## 2020-03-03 VITALS — HEIGHT: 66 IN | SYSTOLIC BLOOD PRESSURE: 128 MMHG | BODY MASS INDEX: 32.25 KG/M2 | DIASTOLIC BLOOD PRESSURE: 78 MMHG

## 2020-03-03 DIAGNOSIS — M77.12 LEFT LATERAL EPICONDYLITIS: Primary | ICD-10-CM

## 2020-03-03 PROCEDURE — 99203 OFFICE O/P NEW LOW 30 MIN: CPT | Performed by: FAMILY MEDICINE

## 2020-03-03 NOTE — PATIENT INSTRUCTIONS
Diagnosis: Left Lateral Epicondylitis  Image Findings: none  Treatment: Home exercises, can consider a one time steroid injection if pain severe.  Can also consider a platelet rich plasma injection ($450)   Medications: Limited tylenol/ibuprofen for pain for 1-2 weeks  Follow-up: As needed    It was great seeing you today!    Jose Alberto Mrecedes    Tennis Elbow (Lateral Epicondylitis)    Tennis elbow, or lateral epicondylitis, is a painful condition of the elbow caused by overuse. Not surprisingly, playing tennis or other racquet sports can cause this condition. However, several other sports and activities can also put you at risk.  Tennis elbow is an inflammation of the tendons that join the forearm muscles on the outside of the elbow. The forearm muscles and tendons become damaged from overuse -- repeating the same motions again and again. This leads to pain and tenderness on the outside of the elbow.  There are many treatment options for tennis elbow. In most cases, treatment involves a team approach. Primary doctors, physical therapists, and, in some cases, surgeons work together to provide the most effective care.  Anatomy  Your elbow joint is a joint made up of three bones: your upper arm bone (humerus) and the two bones in your forearm (radius and ulna). There are bony bumps at the bottom of the humerus called epicondyles. The bony bump on the outside (lateral side) of the elbow is called the lateral epicondyle.  The ECRB muscle and tendon is usually involved in tennis elbow.   Reproduced and modified from The Body Aamir.   American Academy of Orthopaedic Surgeons, 2003.   Muscles, ligaments, and tendons hold the elbow joint together.  Lateral epicondylitis, or tennis elbow, involves the muscles and tendons of your forearm. Your forearm muscles extend your wrist and fingers. Your forearm tendons -- often called extensors -- attach the muscles to bone. They attach on the lateral epicondyle. The tendon usually  "involved in tennis elbow is called the Extensor Carpi Radialis Brevis (ECRB).  Cause  Overuse  [employer]ock   2015.  Recent studies show that tennis elbow is often due to damage to a specific forearm muscle. The extensor carpi radialis brevis (ECRB) muscle helps stabilize the wrist when the elbow is straight. This occurs during a tennis groundstroke, for example. When the ECRB is weakened from overuse, microscopic tears form in the tendon where it attaches to the lateral epicondyle. This leads to inflammation and pain.  The ECRB may also be at increased risk for damage because of its position. As the elbow bends and straightens, the muscle rubs against bony bumps. This can cause gradual wear and tear of the muscle over time.  Activities  Athletes are not the only people who get tennis elbow. Many people with tennis elbow participate in work or recreational activities that require repetitive and vigorous use of the forearm muscle.  Painters, plumbers, and  are particularly prone to developing tennis elbow. Studies have shown that auto workers, cooks, and even butchers get tennis elbow more often than the rest of the population. It is thought that the repetition and weight lifting required in these occupations leads to injury.  Age  Most people who get tennis elbow are between the ages of 30 and 50, although anyone can get tennis elbow if they have the risk factors. In racquet sports like tennis, improper stroke technique and improper equipment may be risk factors.  Unknown  Lateral epicondylitis can occur without any recognized repetitive injury. This occurence is called \"insidious\" or of an unknown cause.  Symptoms  Location of pain in lateral epicondylitis.   Reproduced from Boogie FRANKS (ed): Essentials of Musculoskeletal Care, Third Edition.   American Academy of Orthopaedic Surgeons, 2005.   The symptoms of tennis elbow develop gradually. In most cases, the pain begins as mild and slowly worsens over weeks " and months. There is usually no specific injury associated with the start of symptoms.  Common signs and symptoms of tennis elbow include:  Pain or burning on the outer part of your elbow   Weak  strength  The symptoms are often worsened with forearm activity, such as holding a racquet, turning a wrench, or shaking hands. Your dominant arm is most often affected; however both arms can be affected.  Related Articles   Diseases & Conditions  Elbow Injuries in the Throwing Athlete   Treatment  Platelet-Rich Plasma (PRP)   Staying Healthy  Warm Up, Cool Down and Be Flexible     Doctor Examination  Your doctor will consider many factors in making a diagnosis. These include how your symptoms developed, any occupational risk factors, and recreational sports participation.  Your doctor will talk to you about what activities cause symptoms and where on your arm the symptoms occur. Be sure to tell your doctor if you have ever injured your elbow. If you have a history of rheumatoid arthritis or nerve disease, tell your doctor.  During the examination, your doctor will use a variety of tests to pinpoint the diagnosis. For example, your doctor may ask you to try to straighten your wrist and fingers against resistance with your arm fully straight to see if this causes pain. If the tests are positive, it tells your doctor that those muscles may not be healthy.  Tests  Your doctor may recommend additional tests to rule out other causes of your problem.  X-rays. These tests provide clear images of dense structures like bone. They may be taken to rule out arthritis of the elbow.   Magnetic resonance imaging (MRI) scan. If your doctor thinks your symptoms are related to a neck problem, an MRI scan may be ordered. MRIs scans show details of soft tissues, and will help your doctor see if you have a possible herniated disk or arthritis in your neck. Both of these conditions often produce arm pain.   Electromyography (EMG). Your  "doctor may order an EMG to rule out nerve compression. Many nerves travel around the elbow, and the symptoms of nerve compression are similar to those of tennis elbow.  Treatment  Nonsurgical Treatment  Approximately 80% to 95% of patients have success with nonsurgical treatment.  Rest. The first step toward recovery is to give your arm proper rest. This means that you will have to stop participation in sports or heavy work activities for several weeks.  Non-steroidal anti-inflammatory medicines. Drugs like aspirin or ibuprofen reduce pain and swelling.  Wrist stretching exercise with elbow extended.  Physical therapy. Specific exercises are helpful for strengthening the muscles of the forearm. Your therapist may also perform ultrasound, ice massage, or muscle-stimulating techniques to improve muscle healing.  Brace. Using a brace centered over the back of your forearm may also help relieve symptoms of tennis elbow. This can reduce symptoms by resting the muscles and tendons.  Steroid injections. Steroids, such as cortisone, are very effective anti-inflammatory medicines. Your doctor may decide to inject your damaged muscle with a steroid to relieve your symptoms.  Counterforce brace.  Extracorporeal shock wave therapy. Shock wave therapy sends sound waves to the elbow. These sound waves create \"microtrauma\" that promote the body's natural healing processes. Shock wave therapy is considered experimental by many doctors, but some sources show it can be effective.  Equipment check. If you participate in a racquet sport, your doctor may encourage you to have your equipment checked for proper fit. Stiffer racquets and looser-strung racquets often can reduce the stress on the forearm, which means that the forearm muscles do not have to work as hard. If you use an oversized racquet, changing to a smaller head may help prevent symptoms from recurring.  Surgical Treatment  If your symptoms do not respond after 6 to 12 " months of nonsurgical treatments, your doctor may recommend surgery.  Most surgical procedures for tennis elbow involve removing diseased muscle and reattaching healthy muscle back to bone.  The right surgical approach for you will depend on a range of factors. These include the scope of your injury, your general health, and your personal needs. Talk with your doctor about the options. Discuss the results your doctor has had, and any risks associated with each procedure.  Open surgery. The most common approach to tennis elbow repair is open surgery. This involves making an incision over the elbow.  Open surgery is usually performed as an outpatient surgery. It rarely requires an overnight stay at the hospital.  Arthroscopic surgery. Tennis elbow can also be repaired using miniature instruments and small incisions. Like open surgery, this is a same-day or outpatient procedure.  Surgical risks. As with any surgery, there are risks with tennis elbow surgery. The most common things to consider include:  Infection   Nerve and blood vessel damage   Possible prolonged rehabilitation   Loss of strength   Loss of flexibility   The need for further surgery  Rehabilitation. Following surgery, your arm may be immobilized temporarily with a splint. About 1 week later, the sutures and splint are removed.  After the splint is removed, exercises are started to stretch the elbow and restore flexibility. Light, gradual strengthening exercises are started about 2 months after surgery.  Your doctor will tell you when you can return to athletic activity. This is usually 4 to 6 months after surgery. Tennis elbow surgery is considered successful in 80% to 90% of patients. However, it is not uncommon to see a loss of strength.  New Developments  Platelet-rich plasma (PRP) is currently being investigated for its effectiveness in speeding the healing of a variety of tendon injuries. PRP is a preparation developed from a patient's own blood.  It contains a high concentration of proteins called growth factors that are very important in the healing of injuries.

## 2020-03-03 NOTE — LETTER
3/3/2020         RE: Lance Ramos  [address] [facility] [address]        Dear Colleague,    Thank you for referring your patient, Lance Ramos, to the [facility] [place]. Please see a copy of my visit note below.    ASSESSMENT & PLAN  Lance was seen today for pain.    Diagnoses and all orders for this visit:    Left lateral epicondylitis      Patient is a 49 year old male presenting for evaluation of   Chief Complaint   Patient presents with     Left Elbow - Pain      # Left Lateral Epicondylitis: Notable over the past 8 mon with no tx yet.  Pt having TTP over lateral epicondyle, worse with resisted wrist extension and middle finger extension.  Likely cause being lateral epicondylitis.  Pt presenting for steroid injection but will hold off for now and try HEP first.  Pt can f/u for steroid injection if not improved  Treatment: Activities as tolerated including hockey  Physical Therapy HEp, if not improved can refer for formal PT  Injection defer for now, counseled on steroid and PRP injections  Medications  Limited NSAIDs/Tylenol    Concerning signs/sx that would warrant urgent evaluation were discussed.  All questions were answered, patient understands and agrees with plan.      Return if symptoms worsen or fail to improve.    -----    SUBJECTIVE  Lance Ramos is a/an 49 year old Right handed male who is seen in consultation at the request of  Mona Lockwood PA-C for evaluation of left elbow pain. The patient is seen by themselves.    Onset: 8 month(s) ago. Reports insidious onset without acute precipitating event. 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 [employer]  Location of Pain: left lateral elbow  Rating of Pain at worst: 7/10  Rating of Pain Currently: mild   Worsened by: twisting, lifting, increased activity   Better with: rest   Treatments tried: Motion Care, Ibuprofen    Associated symptoms: weakness secondary to pain   Orthopedic history: NO  Relevant surgical history: NO  Past  Medical History:   Diagnosis Date     Ankle joint pain      Epistaxis      Hyperlipidemia      Snores      Social History     Socioeconomic History     Marital status:      Spouse name: Not on file     Number of children: Not on file     Years of education: Not on file     Highest education level: Not on file   Occupational History     Not on file   Social Needs     Financial resource strain: Not on file     Food insecurity:     Worry: Not on file     Inability: Not on file     Transportation needs:     Medical: Not on file     Non-medical: Not on file   Tobacco Use     Smoking status: Never Smoker     Smokeless tobacco: Never Used   Substance and Sexual Activity     Alcohol use: Yes     Comment: 0 - 2 PER WEEK     Drug use: No     Sexual activity: Yes     Partners: Female   Lifestyle     Physical activity:     Days per week: Not on file     Minutes per session: Not on file     Stress: Not on file   Relationships     Social connections:     Talks on phone: Not on file     Gets together: Not on file     Attends Scientologist service: Not on file     Active member of club or organization: Not on file     Attends meetings of clubs or organizations: Not on file     Relationship status: Not on file     Intimate partner violence:     Fear of current or ex partner: Not on file     Emotionally abused: Not on file     Physically abused: Not on file     Forced sexual activity: Not on file   Other Topics Concern     Parent/sibling w/ CABG, MI or angioplasty before 65F 55M? No   Social History Narrative     Not on file       Patient's past medical, surgical, social, and family histories were reviewed today and no changes are noted.  No family history pertinent to the patient's problem today    REVIEW OF SYSTEMS:  10 point ROS is negative other than symptoms noted above in HPI, Past Medical History or as stated below  Constitutional: NEGATIVE for fever, chills, change in weight  Skin: NEGATIVE for worrisome rashes, moles or  "lesions  GI/: NEGATIVE for bowel or bladder changes  Neuro: NEGATIVE for weakness, dizziness or paresthesias    OBJECTIVE:  /78   Ht 1.664 m (5' 5.5\")   BMI 32.25 kg/m      General: healthy, alert and in no distress  HEENT: no scleral icterus or conjunctival erythema  Skin: no suspicious lesions or rash. No jaundice.  CV: regular rhythm by palpation  Resp: normal respiratory effort without conversational dyspnea   Psych: normal mood and affect  Gait: normal steady gait with appropriate coordination and balance  Neuro: Normal sensory exam of bilateral hands.   MSK:  LEFT ELBOW  Inspection:    No swelling, bruising, discoloration, or obvious deformity or asymmetry  Palpation:    Tender about the lateral epicondyle. Remainder of bony, ligamentous and tendinous landmarks are nontender.    Crepitus is Absent  Range of Motion:     Extension full / flexion full / pronation full / supination full  Strength:    Flexion full extension full pronation limited slightly by pain supination full  Special Tests:    Positive: Pain with resisted wrist extension, pain with resisted middle finger extension, pain with resisted pronation    Negative: pain with resisted wrist flexion, pain with resisted supination, milking manuever, varus stress, cubital tunnel (ulnar Tinel's)    Independent visualization of the below image:  No results found for this or any previous visit (from the past 24 hour(s)).          Jose Alberto Mercedes MD, [facility]        Again, thank you for allowing me to participate in the care of your patient.        Sincerely,        Jose Alberto Mercedes MD    "

## 2020-03-03 NOTE — PROGRESS NOTES
ASSESSMENT & PLAN  Lance was seen today for pain.    Diagnoses and all orders for this visit:    Left lateral epicondylitis      Patient is a 49 year old male presenting for evaluation of   Chief Complaint   Patient presents with     Left Elbow - Pain      # Left Lateral Epicondylitis: Notable over the past 8 mon with no tx yet.  Pt having TTP over lateral epicondyle, worse with resisted wrist extension and middle finger extension.  Likely cause being lateral epicondylitis.  Pt presenting for steroid injection but will hold off for now and try HEP first.  Pt can f/u for steroid injection if not improved  Treatment: Activities as tolerated including hockey  Physical Therapy HEp, if not improved can refer for formal PT  Injection defer for now, counseled on steroid and PRP injections  Medications  Limited NSAIDs/Tylenol    Concerning signs/sx that would warrant urgent evaluation were discussed.  All questions were answered, patient understands and agrees with plan.      Return if symptoms worsen or fail to improve.    -----    SUBJECTIVE  Lance Ramos is a/an 49 year old Right handed male who is seen in consultation at the request of  Mona Lockwood PA-C for evaluation of left elbow pain. The patient is seen by themselves.    Onset: 8 month(s) ago. Reports insidious onset without acute precipitating event. 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 [employer]  Location of Pain: left lateral elbow  Rating of Pain at worst: 7/10  Rating of Pain Currently: mild   Worsened by: twisting, lifting, increased activity   Better with: rest   Treatments tried: Motion Care, Ibuprofen    Associated symptoms: weakness secondary to pain   Orthopedic history: NO  Relevant surgical history: NO  Past Medical History:   Diagnosis Date     Ankle joint pain      Epistaxis      Hyperlipidemia      Snores      Social History     Socioeconomic History     Marital status:      Spouse name: Not on file     Number of children: Not on file     Years of  "education: Not on file     Highest education level: Not on file   Occupational History     Not on file   Social Needs     Financial resource strain: Not on file     Food insecurity:     Worry: Not on file     Inability: Not on file     Transportation needs:     Medical: Not on file     Non-medical: Not on file   Tobacco Use     Smoking status: Never Smoker     Smokeless tobacco: Never Used   Substance and Sexual Activity     Alcohol use: Yes     Comment: 0 - 2 PER WEEK     Drug use: No     Sexual activity: Yes     Partners: Female   Lifestyle     Physical activity:     Days per week: Not on file     Minutes per session: Not on file     Stress: Not on file   Relationships     Social connections:     Talks on phone: Not on file     Gets together: Not on file     Attends Lutheran service: Not on file     Active member of club or organization: Not on file     Attends meetings of clubs or organizations: Not on file     Relationship status: Not on file     Intimate partner violence:     Fear of current or ex partner: Not on file     Emotionally abused: Not on file     Physically abused: Not on file     Forced sexual activity: Not on file   Other Topics Concern     Parent/sibling w/ CABG, MI or angioplasty before 65F 55M? No   Social History Narrative     Not on file       Patient's past medical, surgical, social, and family histories were reviewed today and no changes are noted.  No family history pertinent to the patient's problem today    REVIEW OF SYSTEMS:  10 point ROS is negative other than symptoms noted above in HPI, Past Medical History or as stated below  Constitutional: NEGATIVE for fever, chills, change in weight  Skin: NEGATIVE for worrisome rashes, moles or lesions  GI/: NEGATIVE for bowel or bladder changes  Neuro: NEGATIVE for weakness, dizziness or paresthesias    OBJECTIVE:  /78   Ht 1.664 m (5' 5.5\")   BMI 32.25 kg/m     General: healthy, alert and in no distress  HEENT: no scleral icterus or " conjunctival erythema  Skin: no suspicious lesions or rash. No jaundice.  CV: regular rhythm by palpation  Resp: normal respiratory effort without conversational dyspnea   Psych: normal mood and affect  Gait: normal steady gait with appropriate coordination and balance  Neuro: Normal sensory exam of bilateral hands.   MSK:  LEFT ELBOW  Inspection:    No swelling, bruising, discoloration, or obvious deformity or asymmetry  Palpation:    Tender about the lateral epicondyle. Remainder of bony, ligamentous and tendinous landmarks are nontender.    Crepitus is Absent  Range of Motion:     Extension full / flexion full / pronation full / supination full  Strength:    Flexion full extension full pronation limited slightly by pain supination full  Special Tests:    Positive: Pain with resisted wrist extension, pain with resisted middle finger extension, pain with resisted pronation    Negative: pain with resisted wrist flexion, pain with resisted supination, milking manuever, varus stress, cubital tunnel (ulnar Tinel's)    Independent visualization of the below image:  No results found for this or any previous visit (from the past 24 hour(s)).          Jose Alberto Mercedes MD, [facility]

## 2020-03-04 ENCOUNTER — ANESTHESIA EVENT (OUTPATIENT)
Dept: GASTROENTEROLOGY | Facility: CLINIC | Age: 50
End: 2020-03-04
Payer: COMMERCIAL

## 2020-03-04 NOTE — ANESTHESIA PREPROCEDURE EVALUATION
Anesthesia Evaluation       history and physical reviewed . Pt has had prior anesthetic.     No history of anesthetic complications     ROS/MED HX    Pulmonary:  - neg pulmonary ROS     Neurologic:  - neg neurologic ROS     Cardiovascular: Comment: Hyperlipidemia      METS/Exercise Tolerance:  10 - Swim, jog briskly   Hematologic:  - neg hematologic  ROS     Musculoskeletal:  - neg musculoskeletal ROS     GI/Hepatic:  - neg GI/hepatic ROS     Renal:  - ROS Renal section negative     Endo:  - neg endo ROS       Psychiatric:  - neg psychiatric ROS     Infectious Disease:  - neg infectious disease ROS     Other:  - neg other ROS          Physical Exam  Normal systems: cardiovascular, pulmonary and dental    Airway   Mallampati: II  Neck ROM: full    Dental     Cardiovascular       Pulmonary                     Anesthesia Plan      History & Physical Review  History and physical reviewed and following examination; no interval change.    ASA Status:  1 .        Plan for General with Propofol induction. Maintenance will be TIVA.  Reason for MAC:  Deep or markedly invasive procedure (G8)         Postoperative Care      Consents  Anesthetic plan, risks, benefits and alternatives discussed with:  Patient or representative..            .  DPreop diagnosis: ANKLE INSTABILITY  Procedure(s):  COMBINED ARTHROSCOPY ANKLE, OPEN REPAIR TENDON  No Known Allergies  colchicine (COLCRYS) 0.6 MG tablet, Take 1 tablet (0.6 mg) by mouth daily as needed for moderate pain  IBUPROFEN PO,   naproxen (NAPROSYN) 500 MG tablet, Take 1 pill twice per day with food for 10 days then every 12 hours as needed  rosuvastatin (CRESTOR) 20 MG tablet, Take 1 tablet (20 mg) by mouth daily    No current facility-administered medications on file prior to visit.     No results found for this basename: hgb, inr, potassium        Anesthesia Evaluation       history and physical reviewed . Pt has had prior anesthetic.     No history of anesthetic  complications          ROS/MED HX    ENT/Pulmonary:  - neg pulmonary ROS     Neurologic:  - neg neurologic ROS     Cardiovascular: Comment: Hyperlipidemia        METS/Exercise Tolerance:  10 - Swim, jog briskly   Hematologic:  - neg hematologic  ROS       Musculoskeletal:  - neg musculoskeletal ROS       GI/Hepatic:  - neg GI/hepatic ROS       Renal/Genitourinary:  - ROS Renal section negative       Endo:  - neg endo ROS       Psychiatric:  - neg psychiatric ROS       Infectious Disease:  - neg infectious disease ROS       Malignancy:         Other:    - neg other ROS                 Anesthesia Plan      History & Physical Review  History and physical reviewed and following examination; no interval change.    ASA Status:  1 .        Plan for General with Propofol induction. Maintenance will be TIVA.  Reason for MAC:  Deep or markedly invasive procedure (G8)         Postoperative Care      Consents  Anesthetic plan, risks, benefits and alternatives discussed with:  Patient or representative..

## 2020-03-05 ENCOUNTER — HOSPITAL ENCOUNTER (OUTPATIENT)
Facility: CLINIC | Age: 50
Discharge: HOME | End: 2020-03-05
Attending: SURGERY | Admitting: SURGERY
Payer: COMMERCIAL

## 2020-03-05 ENCOUNTER — ANESTHESIA (OUTPATIENT)
Dept: GASTROENTEROLOGY | Facility: CLINIC | Age: 50
End: 2020-03-05
Payer: COMMERCIAL

## 2020-03-05 VITALS
HEART RATE: 85 BPM | SYSTOLIC BLOOD PRESSURE: 118 MMHG | DIASTOLIC BLOOD PRESSURE: 88 MMHG | HEIGHT: 66 IN | BODY MASS INDEX: 31.5 KG/M2 | WEIGHT: 196 LBS | RESPIRATION RATE: 16 BRPM | OXYGEN SATURATION: 99 % | TEMPERATURE: 98.6 F

## 2020-03-05 LAB — COLONOSCOPY: NORMAL

## 2020-03-05 PROCEDURE — 45385 COLONOSCOPY W/LESION REMOVAL: CPT | Mod: PT | Performed by: SURGERY

## 2020-03-05 PROCEDURE — 45384 COLONOSCOPY W/LESION REMOVAL: CPT | Mod: 59 | Performed by: SURGERY

## 2020-03-05 PROCEDURE — 25800030 ZZH RX IP 258 OP 636: Performed by: SURGERY

## 2020-03-05 PROCEDURE — 25000128 H RX IP 250 OP 636: Performed by: NURSE ANESTHETIST, CERTIFIED REGISTERED

## 2020-03-05 PROCEDURE — 45384 COLONOSCOPY W/LESION REMOVAL: CPT | Mod: PT | Performed by: SURGERY

## 2020-03-05 PROCEDURE — 88305 TISSUE EXAM BY PATHOLOGIST: CPT | Performed by: SURGERY

## 2020-03-05 PROCEDURE — 45380 COLONOSCOPY AND BIOPSY: CPT | Performed by: SURGERY

## 2020-03-05 PROCEDURE — 37000008 ZZH ANESTHESIA TECHNICAL FEE, 1ST 30 MIN: Performed by: SURGERY

## 2020-03-05 PROCEDURE — 25000125 ZZHC RX 250: Performed by: NURSE ANESTHETIST, CERTIFIED REGISTERED

## 2020-03-05 PROCEDURE — 88305 TISSUE EXAM BY PATHOLOGIST: CPT | Mod: 26 | Performed by: SURGERY

## 2020-03-05 RX ORDER — LIDOCAINE 40 MG/G
CREAM TOPICAL
Status: DISCONTINUED | OUTPATIENT
Start: 2020-03-05 | End: 2020-03-05 | Stop reason: HOSPADM

## 2020-03-05 RX ORDER — PROPOFOL 10 MG/ML
INJECTION, EMULSION INTRAVENOUS PRN
Status: DISCONTINUED | OUTPATIENT
Start: 2020-03-05 | End: 2020-03-05

## 2020-03-05 RX ORDER — PROPOFOL 10 MG/ML
INJECTION, EMULSION INTRAVENOUS CONTINUOUS PRN
Status: DISCONTINUED | OUTPATIENT
Start: 2020-03-05 | End: 2020-03-05

## 2020-03-05 RX ORDER — LIDOCAINE HYDROCHLORIDE 10 MG/ML
INJECTION, SOLUTION INFILTRATION; PERINEURAL PRN
Status: DISCONTINUED | OUTPATIENT
Start: 2020-03-05 | End: 2020-03-05

## 2020-03-05 RX ORDER — ONDANSETRON 2 MG/ML
4 INJECTION INTRAMUSCULAR; INTRAVENOUS
Status: DISCONTINUED | OUTPATIENT
Start: 2020-03-05 | End: 2020-03-05 | Stop reason: HOSPADM

## 2020-03-05 RX ORDER — GLYCOPYRROLATE 0.2 MG/ML
INJECTION, SOLUTION INTRAMUSCULAR; INTRAVENOUS PRN
Status: DISCONTINUED | OUTPATIENT
Start: 2020-03-05 | End: 2020-03-05

## 2020-03-05 RX ORDER — SODIUM CHLORIDE, SODIUM LACTATE, POTASSIUM CHLORIDE, CALCIUM CHLORIDE 600; 310; 30; 20 MG/100ML; MG/100ML; MG/100ML; MG/100ML
INJECTION, SOLUTION INTRAVENOUS CONTINUOUS
Status: DISCONTINUED | OUTPATIENT
Start: 2020-03-05 | End: 2020-03-05 | Stop reason: HOSPADM

## 2020-03-05 RX ADMIN — SODIUM CHLORIDE, POTASSIUM CHLORIDE, SODIUM LACTATE AND CALCIUM CHLORIDE 1000 ML: 600; 310; 30; 20 INJECTION, SOLUTION INTRAVENOUS at 09:57

## 2020-03-05 RX ADMIN — GLYCOPYRROLATE 0.2 MG: 0.2 INJECTION, SOLUTION INTRAMUSCULAR; INTRAVENOUS at 10:11

## 2020-03-05 RX ADMIN — PROPOFOL 100 MG: 10 INJECTION, EMULSION INTRAVENOUS at 10:11

## 2020-03-05 RX ADMIN — LIDOCAINE HYDROCHLORIDE 40 MG: 10 INJECTION, SOLUTION INFILTRATION; PERINEURAL at 10:11

## 2020-03-05 RX ADMIN — PROPOFOL 200 MCG/KG/MIN: 10 INJECTION, EMULSION INTRAVENOUS at 10:11

## 2020-03-05 ASSESSMENT — MIFFLIN-ST. JEOR
SCORE: 1678.86
SCORE: 1678.86

## 2020-03-05 NOTE — H&P
49 year old year old male here for colonoscopy for screening.    Patient Active Problem List   Diagnosis     CARDIOVASCULAR SCREENING; LDL GOAL LESS THAN 160     Mixed hyperlipidemia     Pain in toes of both feet     Bilateral bunions     Acute gouty arthritis     Gout involving toe, unspecified cause, unspecified chronicity, unspecified laterality       Past Medical History:   Diagnosis Date     Ankle joint pain      Epistaxis      Hyperlipidemia      Snores        Past Surgical History:   Procedure Laterality Date     ARTHROSCOPY ANKLE  3/28/2012    Procedure:ARTHROSCOPY ANKLE; ANKLE ARTHROSCOPY, DEBRIDEMENT, LOOSE BODY EXCISION  (C-ARM); Surgeon:JAD BRANU; Location:[facility]     ARTHROSCOPY ANKLE, OPEN REPAIR TENDON, COMBINED  7/23/2012    Procedure: COMBINED ARTHROSCOPY ANKLE, OPEN REPAIR TENDON;  LEFT ANKLE ARTHROSCOPY AND BROSTROM REPAIR ;  Surgeon: Jad Braun MD;  Location: [facility]     ENT SURGERY      EPISTAXIS POSTERIOR SURGERY     LASIK       REPAIR TENDON FOOT  7/23/2012    Procedure: REPAIR TENDON FOOT;  Brostrom Repair;  Surgeon: Jad Braun MD;  Location: [facility]     VASECTOMY         @FMX@    No current outpatient medications on file.       No Known Allergies    Pt reports that he has never smoked. He has never used smokeless tobacco. He reports current alcohol use. He reports that he does not use drugs.    Exam:  There were no vitals taken for this visit.    Awake, Alert OX3  Lungs - CTA bilaterally  CV - RRR, no murmurs, distal pulses intact  Abd - soft, non-distended, non-tender, +BS  Extr - No cyanosis or edema    A/P 49 year old year old male in need of colonoscopy for screening. Risks, benefits, alternatives, and complications were discussed including the possibility of perforation and the patient agreed to proceed    Dmitriy Barrios MD

## 2020-03-05 NOTE — ANESTHESIA POSTPROCEDURE EVALUATION
Patient: Lance Ramos    Procedure(s):  COLONOSCOPY, WITH POLYPECTOMY AND BIOPSY    Diagnosis:Special screening for malignant neoplasms, colon [Z12.11]  Diagnosis Additional Information: No value filed.    Anesthesia Type:  General    Note:  Anesthesia Post Evaluation    Patient location during evaluation: Phase 2 and Bedside  Patient participation: Able to fully participate in evaluation  Level of consciousness: awake and alert  Pain management: adequate  Airway patency: patent  Cardiovascular status: acceptable  Respiratory status: acceptable  Hydration status: acceptable  PONV: none     Anesthetic complications: None          Last vitals:  Vitals:    03/05/20 0944 03/05/20 0956   BP: (!) 144/113 (!) 135/101   Resp: 16    Temp: 37  C (98.6  F)    SpO2: 99%          Electronically Signed By: Van Patel CRNA, APRN CRNA  March 5, 2020  10:53 AM

## 2020-03-05 NOTE — ANESTHESIA CARE TRANSFER NOTE
Patient: Lance Ramos    Procedure(s):  COLONOSCOPY, WITH POLYPECTOMY AND BIOPSY    Diagnosis: Special screening for malignant neoplasms, colon [Z12.11]  Diagnosis Additional Information: No value filed.    Anesthesia Type:   General     Note:  Airway :Nasal Cannula  Patient transferred to:Phase II  Handoff Report: Identifed the Patient, Identified the Reponsible Provider, Reviewed the pertinent medical history, Discussed the surgical course, Reviewed Intra-OP anesthesia mangement and issues during anesthesia, Set expectations for post-procedure period and Allowed opportunity for questions and acknowledgement of understanding      Vitals: (Last set prior to Anesthesia Care Transfer)    CRNA VITALS  3/5/2020 0954 - 3/5/2020 1024      3/5/2020             Pulse:  93    Ht Rate:  92    SpO2:  97 %                Electronically Signed By: Van Patel CRNA, APRN CRNA  March 5, 2020  10:24 AM

## 2020-03-09 LAB — COPATH REPORT: NORMAL

## 2020-03-11 ENCOUNTER — MYC MEDICAL ADVICE (OUTPATIENT)
Dept: FAMILY MEDICINE | Facility: CLINIC | Age: 50
End: 2020-03-11

## 2020-03-11 DIAGNOSIS — M77.12 LATERAL EPICONDYLITIS OF LEFT ELBOW: Primary | ICD-10-CM

## 2020-03-11 DIAGNOSIS — S76.312A STRAIN OF LEFT HAMSTRING, INITIAL ENCOUNTER: ICD-10-CM

## 2020-09-23 ENCOUNTER — OFFICE VISIT (OUTPATIENT)
Dept: FAMILY MEDICINE | Facility: CLINIC | Age: 50
End: 2020-09-23
Payer: COMMERCIAL

## 2020-09-23 VITALS
WEIGHT: 206.4 LBS | RESPIRATION RATE: 16 BRPM | SYSTOLIC BLOOD PRESSURE: 120 MMHG | BODY MASS INDEX: 33.82 KG/M2 | DIASTOLIC BLOOD PRESSURE: 84 MMHG | TEMPERATURE: 97 F | HEART RATE: 84 BPM

## 2020-09-23 DIAGNOSIS — M65.331 TRIGGER FINGER, RIGHT MIDDLE FINGER: ICD-10-CM

## 2020-09-23 DIAGNOSIS — Z01.818 PREOP GENERAL PHYSICAL EXAM: Primary | ICD-10-CM

## 2020-09-23 DIAGNOSIS — E78.2 MIXED HYPERLIPIDEMIA: ICD-10-CM

## 2020-09-23 DIAGNOSIS — M10.9 GOUT INVOLVING TOE, UNSPECIFIED CAUSE, UNSPECIFIED CHRONICITY, UNSPECIFIED LATERALITY: ICD-10-CM

## 2020-09-23 DIAGNOSIS — Z23 NEED FOR PROPHYLACTIC VACCINATION AND INOCULATION AGAINST INFLUENZA: ICD-10-CM

## 2020-09-23 PROCEDURE — 90682 RIV4 VACC RECOMBINANT DNA IM: CPT | Performed by: PHYSICIAN ASSISTANT

## 2020-09-23 PROCEDURE — 83721 ASSAY OF BLOOD LIPOPROTEIN: CPT | Performed by: PHYSICIAN ASSISTANT

## 2020-09-23 PROCEDURE — 90471 IMMUNIZATION ADMIN: CPT | Performed by: PHYSICIAN ASSISTANT

## 2020-09-23 PROCEDURE — 80053 COMPREHEN METABOLIC PANEL: CPT | Performed by: PHYSICIAN ASSISTANT

## 2020-09-23 PROCEDURE — 36415 COLL VENOUS BLD VENIPUNCTURE: CPT | Performed by: PHYSICIAN ASSISTANT

## 2020-09-23 PROCEDURE — 84550 ASSAY OF BLOOD/URIC ACID: CPT | Performed by: PHYSICIAN ASSISTANT

## 2020-09-23 PROCEDURE — 93000 ELECTROCARDIOGRAM COMPLETE: CPT | Performed by: PHYSICIAN ASSISTANT

## 2020-09-23 PROCEDURE — 99215 OFFICE O/P EST HI 40 MIN: CPT | Mod: 25 | Performed by: PHYSICIAN ASSISTANT

## 2020-09-23 ASSESSMENT — PAIN SCALES - GENERAL: PAINLEVEL: NO PAIN (0)

## 2020-09-23 NOTE — PROGRESS NOTES
[facility]  [address] [address]  [place] [address]  Phone: [phone]  Primary Provider: Dayan Luna  Pre-op Performing Provider: LEX MORALES    PREOPERATIVE EVALUATION:  Today's date: 9/23/2020    Lance Ramos is a 50 year old male who presents for a preoperative evaluation.    Surgical Information:  Surgery Details 9/23/2020   Surgery/Procedure: Right rotator cuff tear   Surgery Location: [facility]   Surgeon: Dr. Solis   Surgery Date: 10/7/2020   Time of Surgery: 2:00 pm   Where patient plans to recover: At home with family   Additional recovery plan details: N/A     Fax number for surgical facility:   Type of Anesthesia Anticipated: General    Subjective     HPI related to upcoming procedure: was lifting something about 2 weeks and felt a pop.  MRI revealed a tear in the rotator cuff and surgical repair was advised.     Preop Questions 9/23/2020   1. Have you ever had a heart attack or stroke? No   2. Have you ever had surgery on your heart or blood vessels, such as a stent placement, a coronary artery bypass, or surgery on an artery in your head, neck, heart, or legs? No   3. Do you have chest pain with activity? No   4. Do you have a history of  heart failure? No   5. Do you currently have a cold, bronchitis or symptoms of other infection? No   6. Do you have a cough, shortness of breath, or wheezing? No   7. Do you or anyone in your family have previous history of blood clots? No   8. Do you or does anyone in your family have a serious bleeding problem such as prolonged bleeding following surgeries or cuts? No   9. Have you ever had problems with anemia or been told to take iron pills? No   10. Have you had any abnormal blood loss such as black, tarry or bloody stools? No   11. Have you ever had a blood transfusion? No   Are you willing to have a blood transfusion if it is medically needed before, during, or after your surgery? Yes   13.  Have you or any of your relatives ever had problems with anesthesia? No   14. Do you have sleep apnea, excessive snoring or daytime drowsiness? No   15. Do you have any artifical heart valves or other implanted medical devices like a pacemaker, defibrillator, or continuous glucose monitor? No   16. Do you have artificial joints? No   17. Are you allergic to latex? No     Patient does not have a Health Care Directive or Living Will: Discussed advance care planning with patient; however, patient declined at this time.    [place] ([place] reviewed:  not reviewed/not due     HYPERLIPIDEMIA - Patient has a long history of significant Hyperlipidemia requiring medication for treatment with recent good control. Patient reports no problems or side effects with the medication.  He does not take the crestor consistently.      Gout.  He has been prescribed allopurinol in the past and would take this sporadically.  He noticed that when he would take it, he would get some mild tingling in his joints.  He does have colchicine to use for flare-ups.  He is working on lifestyle changes to keep gout managed.     Finger locking.  He also c/o trigger finger on right middle finger that started about 6 months ago.  He will wake up with the finger flexed and needs to use warm water to stretch back out.       Review of Systems  Constitutional, neuro, ENT, endocrine, pulmonary, cardiac, gastrointestinal, genitourinary, musculoskeletal, integument and psychiatric systems are negative, except as otherwise noted.    Patient Active Problem List    Diagnosis Date Noted     Acute gouty arthritis 12/04/2017     Priority: Medium     Gout involving toe, unspecified cause, unspecified chronicity, unspecified laterality 12/04/2017     Priority: Medium     Pain in toes of both feet 12/01/2017     Priority: Medium     Bilateral bunions 12/01/2017     Priority: Medium     Mixed hyperlipidemia 03/16/2017     Priority: Medium      CARDIOVASCULAR SCREENING; LDL GOAL LESS THAN 160 12/12/2014     Priority: Medium      Past Medical History:   Diagnosis Date     Ankle joint pain      Epistaxis      Hyperlipidemia      Snores      Past Surgical History:   Procedure Laterality Date     ARTHROSCOPY ANKLE  3/28/2012    Procedure:ARTHROSCOPY ANKLE; ANKLE ARTHROSCOPY, DEBRIDEMENT, LOOSE BODY EXCISION  (C-ARM); Surgeon:JAD BRAUN; Location:[facility]     ARTHROSCOPY ANKLE, OPEN REPAIR TENDON, COMBINED  7/23/2012    Procedure: COMBINED ARTHROSCOPY ANKLE, OPEN REPAIR TENDON;  LEFT ANKLE ARTHROSCOPY AND BROSTROM REPAIR ;  Surgeon: Jad Braun MD;  Location: [facility]     COLONOSCOPY N/A 3/5/2020    Procedure: COLONOSCOPY, WITH POLYPECTOMY AND BIOPSY;  Surgeon: Dmitriy Barrios MD;  Location: [facility]     ENT SURGERY      EPISTAXIS POSTERIOR SURGERY     LASIK       REPAIR TENDON FOOT  7/23/2012    Procedure: REPAIR TENDON FOOT;  Brostrom Repair;  Surgeon: Jad Braun MD;  Location: [facility]     VASECTOMY       Current Outpatient Medications   Medication Sig Dispense Refill     colchicine (COLCRYS) 0.6 MG tablet Take 1 tablet (0.6 mg) by mouth daily as needed for moderate pain 30 tablet 1     IBUPROFEN PO        naproxen (NAPROSYN) 500 MG tablet Take 1 pill twice per day with food for 10 days then every 12 hours as needed 60 tablet 0     rosuvastatin (CRESTOR) 20 MG tablet Take 1 tablet (20 mg) by mouth daily 90 tablet 1       Allergies   Allergen Reactions     Nka [No Known Allergies]         Social History     Tobacco Use     Smoking status: Never Smoker     Smokeless tobacco: Never Used   Substance Use Topics     Alcohol use: Yes     Comment: 0 - 2 PER WEEK       History   Drug Use No            Objective   /84 (BP Location: Left arm, Patient Position: Sitting, Cuff Size: Adult Large)   Pulse 84   Temp 97  F (36.1  C) (Tympanic)   Resp 16   Wt 93.6 kg (206 lb 6.4 oz)   BMI 33.82 kg/m    Physical Exam    GENERAL APPEARANCE: healthy,  alert and no distress     EYES: EOMI,  PERRL     HENT: ear canals and TM's normal and nose and mouth without ulcers or lesions     NECK: no adenopathy, no asymmetry, masses, or scars and thyroid normal to palpation     RESP: lungs clear to auscultation - no rales, rhonchi or wheezes     CV: regular rates and rhythm, normal S1 S2, no S3 or S4 and no murmur, click or rub     ABDOMEN:  soft, nontender, no HSM or masses and bowel sounds normal     MS: extremities normal- no gross deformities noted, no evidence of inflammation in joints, FROM in all extremities.     SKIN: no suspicious lesions or rashes     NEURO: Normal strength and tone, sensory exam grossly normal, mentation intact and speech normal     PSYCH: mentation appears normal. and affect normal/bright     LYMPHATICS: No cervical adenopathy  Hands:  hands are placed in the palms up position and clicking noted over right middle finger with active flexion and extension.  Swelling no  Tenderness no      PRE-OP Diagnostics:  CMP, uric acid and LDL ordered and within normal range.   EKG: appears normal, NSR, normal axis, normal intervals, no acute ST/T changes c/w ischemia, no LVH by voltage criteria, unchanged from previous tracings         Assessment & Plan   The proposed surgical procedure is considered INTERMEDIATE risk.    REVISED CARDIAC RISK INDEX  The patient has the following serious cardiovascular risks for perioperative complications:  No serious cardiac risks = 0 points    INTERPRETATION: 0 points: Class I (very low risk - 0.4% complication rate)       1. Preop general physical exam       HEALTH CARE MAINTENANCE              Reviewed USPTF recommendations and anticipatory guidance.              See orders.     2. Screening for cardiovascular condition  EKG stable for surgery.   - EKG 12-lead complete w/read - Clinics    3. Mixed hyperlipidemia  Due to recheck labs.  Not fasting today, will check LDL.   - Comprehensive metabolic panel (BMP + Alb, Alk  Phos, ALT, AST, Total. Bili, TP)  - LDL cholesterol direct    4. Gout involving toe, unspecified cause, unspecified chronicity, unspecified laterality  Discussed treatment of gout.  I do not recommend he take allopurinol off and on as this can actually induce a flare-up . He prefers to continue with lifestyle changes to try and control gout but may re-consider using allopurinol down the road (especially as uric acid levels were high in the past).  Will recheck levels now.   - Uric acid    5. Trigger finger, right middle finger      I discussed the pathophysiology of this condition as well as treatment options.  I advised him to rest the area (no excessive repetitive movements), apply ice and use anti-inflammatory medications to help reduce inflammation of the tendon.  We did discuss the use of steroid injections to help reduce the swelling, patient may f/u with ortho for that if necessary.  Patient education materials given during examination today.   - Orthopedic & Spine  Referral; Future    Finger splint given today to use at night and he may f/u with ortho if needed.     The patient has the following additional risks and recommendations for perioperative complications:      CARDIOVASCULAR:  Able to perform 4 METs activity without difficulty (climbing 2 flights of stairs)     MEDICATION INSTRUCTIONS:  Patient is on no chronic medications in the am, will take crestor the night before as he typically does.     RECOMMENDATION:  APPROVAL GIVEN to proceed with proposed procedure, without further diagnostic evaluation.    Return in about 2 weeks (around 10/7/2020) for surgery.    Signed Electronically by: Mona Lockwood PA-C    Copy of this evaluation report is provided to requesting physician.    Preop [facility] Preop Guidelines    Revised Cardiac Risk Index

## 2020-09-23 NOTE — PATIENT INSTRUCTIONS
"  Preparing for Your Surgery  Getting started  A surgery nurse will call you to review your health history and instructions. They will give you an arrival time based on your scheduled surgery time.  Please be ready to share the following:    Your doctor's clinic name and phone number    Your medical, surgical and anesthesia history    A list of allergies and sensitivities    A list of medicines, including herbal treatments and over-the-counter drugs    Whether the patient has a legal guardian (ask how to send us the papers in advance)  If your child is having surgery, please ask for a copy of Preparing for Your Child's Surgery.    Preparing for surgery    Within 30 days of surgery: Have an exam at your family clinic (primary care clinic), or go to a pre-operative clinic. This exam is called a \"History and Physical,\" or H&P.    At your H&P exam, talk to your care team about all medicines you take. If you need to stop any medicines before surgery, ask when to start taking them again.  ? We do this for your safety. Many medicines can make you bleed too much during surgery. Some change how well surgery (anesthesia) drugs work.    Call your insurance company to see what it will and won't pay for. Ask if they need to pre-approve the surgery. (If no insurance, call [phone].)    Call your surgeon's clinic if there's any change in your health. This includes signs of a cold or flu (sore throat, runny nose, cough, rash, fever). It also includes a scrape or scratch near the surgery site.    If you have questions on the day of surgery, call your surgery center.  Eating and drinking guidelines  For your safety: Unless your surgeon tells you otherwise, follow the guidelines below.    Eat and drink as usual until 8 hours before surgery. After that, no food or milk.    Drink clear liquids until 2 hours before surgery. These are liquids you can see through, like water, Gatorade and Propel Water. You may also have black coffee " and tea (no cream or milk).    Nothing by mouth within 2 hours of surgery. This includes gum, candy and breath mints.    Stop alcohol the midnight before surgery.    If your family clinic tells you to take medicine on the morning of surgery, it's okay to take it with a sip of water.  Preventing infection    Shower or bathe the night before and morning of your surgery. Follow the instructions your clinic gave you. (If no instructions, use regular soap.)    Don't shave or clip hair near your surgery site. This can lead to skin infection.    Don't smoke the morning of surgery. Smoking increases the risk of infection. You may chew nicotine gum up to 2 hours before surgery. A nicotine patch is okay.  ? Note: Some surgeries require you to completely quit smoking and nicotine. Check with your surgeon.    Your care team will make every effort to keep you safe from infection. We will:  ? Clean our hands often with soap and water (or an alcohol-based hand rub).  ? Clean the skin at your surgery site with a special soap that kills germs. We'll also remove hair from the site as needed.  ? Wear special hair covers, masks, gowns and gloves during surgery.  ? Give antibiotic medicine, if prescribed. Not all surgeries need antibiotics.  What to bring on the day of surgery    Photo ID and insurance card    Copy of your health care directive, if you have one    Glasses and hearing aides (bring cases)  ? You can't wear contacts during surgery    Inhaler and eye drops, if you use them (tell us about these when you arrive)    CPAP machine or breathing device, if you use them    A few personal items, if spending the night    If you have . . .  ? A pacemaker or ICD (cardiac defibrillator): Bring the ID card.  ? An implanted stimulator: Bring the remote control.  ? A legal guardian: Bring a copy of the certified (court-stamped) guardianship papers.  Please remove any jewelry, including body piercings. Leave jewelry and other valuables at  home.  If you're going home the day of surgery  Important: If you don't follow the rules below, we must cancel your surgery.     Arrange for someone to drive you home after surgery. You may not drive, take a taxi or take public transportation by yourself (unless you'll have local anesthesia only).    Arrange for a responsible adult to stay with you overnight. If you don't, we may keep you in the hospital overnight, and you may need to pay the costs yourself.  Questions?   If you have any questions for your care team, list them here: _________________________________________________________________________________________________________________________________________________________________________________________________________________________________________________________________________________________________________________________  For informational purposes only. Not to replace the advice of your health care provider. Copyright   7633-5972 [employer]. All rights reserved. Clinically reviewed by Lidia Eaton MD. [employer] 740350 - REV 07/19.    Patient Education     Treating Trigger Finger    Trigger finger occurs when the tissue inside your finger or thumb becomes inflamed. Mild cases can be treated without surgery. If the problem is severe, surgery may be needed. Your healthcare provider will talk with you about your options.  Nonsurgical treatment  For mild symptoms, your healthcare provider may have you rest the finger or thumb. You may also be told to take anti-inflammatory medicines. These include ibuprofen or aspirin. You may be given an injection of medicine in the base of the finger or thumb. This typically is a steroid, such as cortisone.  Surgery  If nonsurgical treatments don t ease your symptoms, you may need surgery. A tendon is a cordlike fiber that attaches muscle to bone and allows joints to bend. The tendon is surrounded by a protective cover called a sheath. During surgery,  the sheath in your finger or thumb is opened to enlarge the space and release the swollen tendon. This allows the finger or thumb to bend and straighten normally. Surgery takes about 20 minutes. It can often be done using a local anesthetic. You may also be sedated. You will likely be able to go home the same day. Your hand will be wrapped in a soft bandage. You may need to wear a plaster splint for a short time to keep the finger or thumb still as it heals. The stitches will be removed in about 2 weeks. Your healthcare provider will talk with you about the risks and benefits of surgery.  Date Last Reviewed: 1/1/2018 2000-2019 [employer]. [address] [place] [address], [place] [address]. All rights reserved. This information is not intended as a substitute for professional medical care. Always follow your healthcare professional's instructions.

## 2020-09-24 LAB
ALBUMIN SERPL-MCNC: 4.5 G/DL (ref 3.4–5)
ALP SERPL-CCNC: 65 U/L (ref 40–150)
ALT SERPL W P-5'-P-CCNC: 41 U/L (ref 0–70)
ANION GAP SERPL CALCULATED.3IONS-SCNC: 7 MMOL/L (ref 3–14)
AST SERPL W P-5'-P-CCNC: 20 U/L (ref 0–45)
BILIRUB SERPL-MCNC: 0.6 MG/DL (ref 0.2–1.3)
BUN SERPL-MCNC: 15 MG/DL (ref 7–30)
CALCIUM SERPL-MCNC: 9.6 MG/DL (ref 8.5–10.1)
CHLORIDE SERPL-SCNC: 103 MMOL/L (ref 94–109)
CO2 SERPL-SCNC: 27 MMOL/L (ref 20–32)
CREAT SERPL-MCNC: 0.98 MG/DL (ref 0.66–1.25)
GFR SERPL CREATININE-BSD FRML MDRD: 88 ML/MIN/{1.73_M2}
GLUCOSE SERPL-MCNC: 89 MG/DL (ref 70–99)
LDLC SERPL DIRECT ASSAY-MCNC: 106 MG/DL
POTASSIUM SERPL-SCNC: 4.3 MMOL/L (ref 3.4–5.3)
PROT SERPL-MCNC: 8.2 G/DL (ref 6.8–8.8)
SODIUM SERPL-SCNC: 137 MMOL/L (ref 133–144)
URATE SERPL-MCNC: 7.2 MG/DL (ref 3.5–7.2)

## 2020-09-25 PROBLEM — M65.331 TRIGGER FINGER, RIGHT MIDDLE FINGER: Status: ACTIVE | Noted: 2020-09-25

## 2020-10-28 ENCOUNTER — VIRTUAL VISIT (OUTPATIENT)
Dept: FAMILY MEDICINE | Facility: CLINIC | Age: 50
End: 2020-10-28
Payer: COMMERCIAL

## 2020-10-28 DIAGNOSIS — U07.1 2019 NOVEL CORONAVIRUS DISEASE (COVID-19): ICD-10-CM

## 2020-10-28 DIAGNOSIS — R05.9 COUGH: ICD-10-CM

## 2020-10-28 DIAGNOSIS — Z11.59 NEED FOR HEPATITIS C SCREENING TEST: Primary | ICD-10-CM

## 2020-10-28 PROCEDURE — 99213 OFFICE O/P EST LOW 20 MIN: CPT | Mod: TEL | Performed by: PHYSICIAN ASSISTANT

## 2020-10-28 RX ORDER — ALBUTEROL SULFATE 90 UG/1
2 AEROSOL, METERED RESPIRATORY (INHALATION) EVERY 4 HOURS PRN
Qty: 1 INHALER | Refills: 0 | Status: SHIPPED | OUTPATIENT
Start: 2020-10-28 | End: 2022-07-13

## 2020-10-28 RX ORDER — CODEINE PHOSPHATE AND GUAIFENESIN 10; 100 MG/5ML; MG/5ML
1-2 SOLUTION ORAL EVERY 4 HOURS PRN
Qty: 118 ML | Refills: 0 | Status: SHIPPED | OUTPATIENT
Start: 2020-10-28 | End: 2022-07-13

## 2020-10-28 RX ORDER — BENZONATATE 100 MG/1
100 CAPSULE ORAL 3 TIMES DAILY PRN
Qty: 25 CAPSULE | Refills: 0 | Status: SHIPPED | OUTPATIENT
Start: 2020-10-28 | End: 2022-07-13

## 2020-10-28 RX ORDER — DEXAMETHASONE 2 MG/1
10 TABLET ORAL ONCE
Qty: 5 TABLET | Refills: 0 | Status: SHIPPED | OUTPATIENT
Start: 2020-10-28 | End: 2022-07-13

## 2020-10-28 NOTE — PROGRESS NOTES
"Lance Ramos is a 50 year old male who is being evaluated via a billable telephone visit.      The patient has been notified of following:     \"This telephone visit will be conducted via a call between you and your physician/provider. We have found that certain health care needs can be provided without the need for a physical exam.  This service lets us provide the care you need with a short phone conversation.  If a prescription is necessary we can send it directly to your pharmacy.  If lab work is needed we can place an order for that and you can then stop by our lab to have the test done at a later time.    Telephone visits are billed at different rates depending on your insurance coverage. During this emergency period, for some insurers they may be billed the same as an in-person visit.  Please reach out to your insurance provider with any questions.    If during the course of the call the physician/provider feels a telephone visit is not appropriate, you will not be charged for this service.\"    Patient has given verbal consent for Telephone visit?  Yes    What phone number would you like to be contacted at? [phone]    How would you like to obtain your AVS? MyChart    Subjective     Lance Ramos is a 50 year old male who presents via phone visit today for the following health issues:    HPI     Following up on: positive covid    Last visit this was discussed: pop up testing site on Sunday    Progression of Symptoms:  same    Accompanying Signs & Symptoms: cough-would like something for cough     Tested Positive for Covid-19. Wants something to help with his dry cough, particularly when active. Symptoms since 10/18/20 as headache and then cough. Fevers over the last 4 days. Had rotator cuff surgery on 10/7/20. Has tried otc cough medication and analgesics. No sob or chest pain.  Does not feel dizzy.    Review of Systems   Constitutional, HEENT, cardiovascular, pulmonary, gi and gu systems are " negative, except as otherwise noted.       Objective          Vitals:  No vitals were obtained today due to virtual visit.    healthy, alert and no distress  PSYCH: Alert and oriented times 3; coherent speech, normal   rate and volume, able to articulate logical thoughts, able   to abstract reason, no tangential thoughts, no hallucinations   or delusions  His affect is normal and pleasant  RESP: Coughing during call, no audible wheezing, able to talk in full sentences  Remainder of exam unable to be completed due to telephone visits     Assessment & Plan   Problem List Items Addressed This Visit     None      Visit Diagnoses     Need for hepatitis C screening test    -  Primary    2019 novel coronavirus disease (COVID-19)        Relevant Medications    albuterol (PROAIR HFA/PROVENTIL HFA/VENTOLIN HFA) 108 (90 Base) MCG/ACT inhaler    benzonatate (TESSALON) 100 MG capsule    guaiFENesin-codeine (ROBITUSSIN AC) 100-10 MG/5ML solution    Cough        Relevant Medications    albuterol (PROAIR HFA/PROVENTIL HFA/VENTOLIN HFA) 108 (90 Base) MCG/ACT inhaler    benzonatate (TESSALON) 100 MG capsule    guaiFENesin-codeine (ROBITUSSIN AC) 100-10 MG/5ML solution         Impression is ongoing cough from COVID-19. No red flags symptoms to necessitate emergent eval today. Will provide dexamethasone dose as well as meds for symptoms. Follow up at any time if worsening and in 2 weeks if not improving. Will continue with otc analgesics, pushing of po fluids and rest. Continue to quarantine.    DDx and Dx discussed with and explained to the pt to their satisfaction.  All questions were answered at this time. Pt expressed understanding of and agreement with this dx, tx, and plan. No further workup warranted and standard medication warnings given. I have given the patient a list of pertinent indications for re-evaluation. Will go to the Emergency Department if symptoms worsen or new concerning symptoms arise. Patient left the call in no  apparent distress.     See Patient Instructions  Return in about 2 weeks (around 11/11/2020), or if symptoms worsen or fail to improve.    REGLA Perkins  [facility]    Phone call duration:  9 minutes

## 2020-10-28 NOTE — PATIENT INSTRUCTIONS
Caroline Coley,    Thank you for allowing [facility] to manage your care.    I sent your prescriptions to your pharmacy.    For your fever and pain, please use Ibuprofen 400mg four times daily with food. Between ibuprofen doses, you may use Tylenol 650mg.     Max acetaminophen (Tylenol) 4,000mg/24 hours  Max ibuprofen 3,200mg/24 hours    For severe cough not controlled by other counter medications, please use robitussin with codeine as prescribed. Do not use this medication while driving, operating machinery, with other sedating medications, or while drinking alcohol as it will make you drowsy.      Narcotics Discharge Instructions: Codeine    You have been prescribed a narcotic pain medication that has risk for addiction with prolonged use, so please use sparingly.  Additionally, narcotics are medications that are sedating (will make you sleepy), so do not drive or operate machinery while taking this medication.  Avoid alcohol or other sedating medicines such as benzodiazepines while taking narcotics due to risk for increased sedation and difficulty breathing.      Narcotics will cause constipation.  If you need to take this medication please consider taking an over-the-counter stool softener and laxative, such as Senna Plus, to prevent constipation from developing.  Nausea is a side effect of narcotic use.  Possible additional side effects include vomiting, itching, and dizziness/lightheadedness.    If you have any questions or concerns, please feel free to call us at [phone]    Sincerely,    Riky Collins PA-C    Did you know?      You can schedule a video visit for follow-up appointments as well as future appointments for certain conditions.  Please see the below link.     [email or web address]    If you have not already done so,  I encourage you to sign up for [employer] ([email or web address]).  This will allow you to review your results, securely  "communicate with a provider, and schedule virtual visits as well.      Patient Education   Discharge Instructions for COVID-19 Patients  You have--or may have--COVID-19. Please follow the instructions listed below.   If you have a weakened immune system, discuss with your doctor any other actions you need to take.  How can I protect others?  If you have symptoms (fever, cough, body aches or trouble breathing):    Stay home and away from others (self-isolate) until:  ? At least 10 days have passed since your symptoms started, And   ? You've had no fever--and no medicine that reduces fever--for 1 full day (24 hours), And    ? Your other symptoms have resolved (gotten better).  If you don't show symptoms, but testing showed that you have COVID-19:    Stay home and away from others (self-isolate). Follow the tips under \"How do I self-isolate?\" below for 10 days (20 days if you have a weak immune system).    You don't need to be retested for COVID-19 before going back to school or work. As long as you're fever-free and feeling better, you can go back to school, work and other activities after waiting the 10 or 20 days.   How do I self-isolate?    Stay in your own room, even for meals. Use your own bathroom if you can.    Stay away from others in your home. No hugging, kissing or shaking hands. No visitors.    Don't go to work, school or anywhere else.    Clean \"high touch\" surfaces often (doorknobs, counters, handles). Use household cleaning spray or wipes. You'll find a full list of  on the EPA website: www.epa.gov/pesticide-registration/list-n-disinfectants-use-against-sars-cov-2.    Cover your mouth and nose with a mask or other face covering to avoid spreading germs.    Wash your hands and face often. Use soap and water.    Caregivers in these groups are at risk for severe illness due to COVID-19:  ? People 65 years and older  ? People who live in a nursing home or long-term care facility  ? People with " chronic disease (lung, heart, cancer, diabetes, kidney, liver, immunologic)  ? People who have a weakened immune system, including those who:    Are in cancer treatment    Take medicine that weakens the immune system, such as corticosteroids    Had a bone marrow or organ transplant    Have an immune deficiency    Have poorly controlled HIV or AIDS    Are obese (body mass index of 40 or higher)    Smoke regularly    Caregivers should wear gloves while washing dishes, handling laundry and cleaning bedrooms and bathrooms.    Use caution when washing and drying laundry: Don't shake dirty laundry and use the warmest water setting that you can.    For more tips on managing your health at home, go to www.cdc.gov/coronavirus/2019-ncov/downloads/10Things.pdf.  How can I take care of myself at home?  1. Get lots of rest. Drink extra fluids (unless a doctor has told you not to).    2. Take Tylenol (acetaminophen) for fever or pain. If you have liver or kidney problems, ask your family doctor if it's okay to take Tylenol.     Adults can take either:  ? 650 mg (two 325 mg pills) every 4 to 6 hours, or   ? 1,000 mg (two 500 mg pills) every 8 hours as needed.  ? Note: Don't take more than 3,000 mg in one day. Acetaminophen is found in many medicines (both prescribed and over-the-counter medicines). Read all labels to be sure you don't take too much.   For children, check the Tylenol bottle for the right dose. The dose is based on the child's age or weight.  3. If you have other health problems (like cancer, heart failure, an organ transplant or severe kidney disease): Call your specialty clinic if you don't feel better in the next 2 days.    4. Know when to call 911. Emergency warning signs include:  ? Trouble breathing or shortness of breath  ? Pain or pressure in the chest that doesn't go away  ? Feeling confused like you haven't felt before, or not being able to wake up  ? Bluish-colored lips or face    5. Your doctor may have  prescribed a blood thinner medicine. Follow their instructions.  Where can I get more information?    [facility] - About COVID-19: [email or web address]    CDC - What to Do If You're Sick: www.cdc.gov/coronavirus/2019-ncov/about/steps-when-sick.html    CDC - Ending Home Isolation: www.cdc.gov/coronavirus/2019-ncov/hcp/disposition-in-home-patients.html    CDC - Caring for Someone: www.cdc.gov/coronavirus/2019-ncov/if-you-are-sick/care-for-someone.html    [facility] - Interim Guidance for Hospital Discharge to Home: www.health.[facility].[place]/diseases/coronavirus/hcp/hospdischarge.pdf    [facility] clinical trials (COVID-19 research studies): clinicalaffairs.[facility]-clinical-trials    Below are the COVID-19 hotlines at the [place] Department of Health ([facility]). Interpreters are available.  ? For health questions: Call 329-364-0473 or 1-666.272.2548 (7 a.m. to 7 p.m.)  ? For questions about schools and childcare: Call 635-525-7299 or 1-214.750.6055 (7 a.m. to 7 p.m.)    For informational purposes only. Not to replace the advice of your health care provider. Clinically reviewed by the Infection Prevention Team. Copyright   2020 [facility]. All rights reserved. [employer] 294422 - REV 08/04/20.

## 2021-02-21 ENCOUNTER — HEALTH MAINTENANCE LETTER (OUTPATIENT)
Age: 51
End: 2021-02-21

## 2021-04-16 ENCOUNTER — IMMUNIZATION (OUTPATIENT)
Dept: FAMILY MEDICINE | Facility: CLINIC | Age: 51
End: 2021-04-16
Payer: COMMERCIAL

## 2021-04-16 PROCEDURE — 91301 PR COVID VAC MODERNA 100 MCG/0.5 ML IM: CPT

## 2021-04-16 PROCEDURE — 0011A PR COVID VAC MODERNA 100 MCG/0.5 ML IM: CPT

## 2021-09-14 ENCOUNTER — MYC MEDICAL ADVICE (OUTPATIENT)
Dept: FAMILY MEDICINE | Facility: CLINIC | Age: 51
End: 2021-09-14

## 2021-09-14 NOTE — TELEPHONE ENCOUNTER
Spoke to patient, an order was already in place from 09/20/2020. This has been faxed to [facility] @ [phone]. Patient had no further questions.   
normal rate, regular rhythm, and no murmur.

## 2021-09-26 ENCOUNTER — HEALTH MAINTENANCE LETTER (OUTPATIENT)
Age: 51
End: 2021-09-26

## 2022-03-13 ENCOUNTER — HEALTH MAINTENANCE LETTER (OUTPATIENT)
Age: 52
End: 2022-03-13

## 2022-04-05 ENCOUNTER — OFFICE VISIT (OUTPATIENT)
Dept: PODIATRY | Facility: CLINIC | Age: 52
End: 2022-04-05
Payer: COMMERCIAL

## 2022-04-05 VITALS
SYSTOLIC BLOOD PRESSURE: 128 MMHG | BODY MASS INDEX: 32.78 KG/M2 | WEIGHT: 200 LBS | HEART RATE: 90 BPM | DIASTOLIC BLOOD PRESSURE: 90 MMHG

## 2022-04-05 DIAGNOSIS — L60.0 INGROWING NAIL: Primary | ICD-10-CM

## 2022-04-05 PROCEDURE — 99203 OFFICE O/P NEW LOW 30 MIN: CPT | Mod: 25 | Performed by: PODIATRIST

## 2022-04-05 PROCEDURE — 11730 AVULSION NAIL PLATE SIMPLE 1: CPT | Mod: T5 | Performed by: PODIATRIST

## 2022-04-05 NOTE — LETTER
4/5/2022         RE: Lance Ramos  [address] [place] [address]        Dear Colleague,    Thank you for referring your patient, Lance Ramos, to the [facility] [place]. Please see a copy of my visit note below.    Subjective:    Pt is seen today as a new pt referral w/ the c/c of a painful ingrown right great nail lateral border.  This has been problematic for 13 month(s).  negativehistory of drainage from the site. This is slowly getting worse.  Aggravated by activity and relieved by rest.  Has tried soaking which has not helped.   reports history of trauma to the area on vacation and line growing out on nail.   Denies drainage, erythema    ROS:  See above    Past Medical History:   Diagnosis Date     Ankle joint pain      Epistaxis      Hyperlipidemia      Snores        Past Surgical History:   Procedure Laterality Date     ARTHROSCOPY ANKLE  3/28/2012    Procedure:ARTHROSCOPY ANKLE; ANKLE ARTHROSCOPY, DEBRIDEMENT, LOOSE BODY EXCISION  (C-ARM); Surgeon:SOPHY BRAUN; Location:[facility]     ARTHROSCOPY ANKLE, OPEN REPAIR TENDON, COMBINED  7/23/2012    Procedure: COMBINED ARTHROSCOPY ANKLE, OPEN REPAIR TENDON;  LEFT ANKLE ARTHROSCOPY AND BROSTROM REPAIR ;  Surgeon: Sophy Braun MD;  Location: [facility]     COLONOSCOPY N/A 3/5/2020    Procedure: COLONOSCOPY, WITH POLYPECTOMY AND BIOPSY;  Surgeon: Dmitriy Barrios MD;  Location: [facility]     ENT SURGERY      EPISTAXIS POSTERIOR SURGERY     LASIK       REPAIR TENDON FOOT  7/23/2012    Procedure: REPAIR TENDON FOOT;  Brostrom Repair;  Surgeon: Sophy Braun MD;  Location: [facility]     VASECTOMY         No family history on file.    Social History     Tobacco Use     Smoking status: Never Smoker     Smokeless tobacco: Never Used   Substance Use Topics     Alcohol use: Yes     Comment: 0 - 2 PER WEEK         Current Outpatient Medications:      albuterol (PROAIR HFA/PROVENTIL HFA/VENTOLIN HFA) 108 (90 Base) MCG/ACT inhaler,  Inhale 2 puffs into the lungs every 4 hours as needed (Cough), Disp: 1 Inhaler, Rfl: 0     benzonatate (TESSALON) 100 MG capsule, Take 1 capsule (100 mg) by mouth 3 times daily as needed for cough, Disp: 25 capsule, Rfl: 0     colchicine (COLCRYS) 0.6 MG tablet, Take 1 tablet (0.6 mg) by mouth daily as needed for moderate pain (Patient not taking: Reported on 10/28/2020), Disp: 30 tablet, Rfl: 1     dexamethasone (DECADRON) 2 MG tablet, Take 5 tablets (10 mg) by mouth once for 1 dose, Disp: 5 tablet, Rfl: 0     guaiFENesin-codeine (ROBITUSSIN AC) 100-10 MG/5ML solution, Take 5-10 mLs by mouth every 4 hours as needed for cough, Disp: 118 mL, Rfl: 0     naproxen (NAPROSYN) 500 MG tablet, Take 1 pill twice per day with food for 10 days then every 12 hours as needed (Patient not taking: Reported on 10/28/2020), Disp: 60 tablet, Rfl: 0     rosuvastatin (CRESTOR) 20 MG tablet, Take 1 tablet (20 mg) by mouth daily, Disp: 90 tablet, Rfl: 1       Allergies   Allergen Reactions     Nka [No Known Allergies]        BP (!) 128/90   Pulse 90   Wt 90.7 kg (200 lb)   BMI 32.78 kg/m  .      Constitutional/ general:  Pt is in no apparent distress, appears well-nourished.  Cooperative with history and physical exam.     Psych:  The patient answered questions appropriately.  Normal affect.  Seems to have reasonable expectations, in terms of treatment.     Lungs:  Non labored breathing, non labored speech. No cough.  No audible wheezing. Even, quiet breathing.       Vascular:  Pedal pulses are palpable bilaterally for both the DP and PT arteries.  CFT < 3 sec.  No ankle varicosities or edema.  Pedal hair growth noted.     Neuro:  Alert and oriented x 3. Coordinated gait.  Light touch sensation is intact     Derm: Normal texture and turgor.  No ecchymosis, or cyanosis.  Hair growth noted.        Musculoskeletal:     Patient is ambulatory without an assistive device or brace.  No gross deformities.  Normal arch with weightbearing.  No  forefoot or rear foot deformities noted.  MS 5/5 all compartments.  Normal ROM all fore foot and rearfoot joints.  No equinus.  right great toe nail lateral border shows soft tissue impingement with localized erythema.   negative active drainage/purulence at this time.  No sinus tracts.  No nailbed masses or exostosis.  No pain with range of motion of IPJ or MTPJ.  No ascending cellulitis.    ASSESSMENT:    Onychocryptosis with paronychia right toe.    Discussed etiology and treatment options in detail w/ the pt.  The potential causes and nature of an ingrown toenail were discussed with the patient.  We reviewed the natural history/prognosis of the condition and potential risks if no treatment is provided.      Treatment options discussed included conservative management (oral antibiotics, soaking of foot, adequate width shoes)  as well as surgical management (partial or total nail removal).  The pros and cons of both forms of treatment were reviewed.  Handout given to patient.      After thorough discussion and answering all questions, the patient elected to have nail avulsion.  Obtained consent, used 3cc of 1% lidocaine plain to block right first toe.  Sterile prep, then avulsed the affected border(s).  No evidence of deep abscess noted.  Pt tolerated procedure well.  Sterile bandage placed, gave wound care instruction.  Instructed patient on trimming nails correctly.  They will avoid tight shoes.  Avoid pedicures.  Discussed permanent removal of border if chronic problem.  Return to clinic prn.    Dmitriy Gibson DPM, FACFAS          Again, thank you for allowing me to participate in the care of your patient.        Sincerely,        Dmitriy Gibson DPM

## 2022-04-05 NOTE — PROGRESS NOTES
Subjective:    Pt is seen today as a new pt referral w/ the c/c of a painful ingrown right great nail lateral border.  This has been problematic for 13 month(s).  negativehistory of drainage from the site. This is slowly getting worse.  Aggravated by activity and relieved by rest.  Has tried soaking which has not helped.   reports history of trauma to the area on vacation and line growing out on nail.   Denies drainage, erythema    ROS:  See above    Past Medical History:   Diagnosis Date     Ankle joint pain      Epistaxis      Hyperlipidemia      Snores        Past Surgical History:   Procedure Laterality Date     ARTHROSCOPY ANKLE  3/28/2012    Procedure:ARTHROSCOPY ANKLE; ANKLE ARTHROSCOPY, DEBRIDEMENT, LOOSE BODY EXCISION  (C-ARM); Surgeon:JDA BRAUN; Location:[facility]     ARTHROSCOPY ANKLE, OPEN REPAIR TENDON, COMBINED  7/23/2012    Procedure: COMBINED ARTHROSCOPY ANKLE, OPEN REPAIR TENDON;  LEFT ANKLE ARTHROSCOPY AND BROSTROM REPAIR ;  Surgeon: Jad Braun MD;  Location: [facility]     COLONOSCOPY N/A 3/5/2020    Procedure: COLONOSCOPY, WITH POLYPECTOMY AND BIOPSY;  Surgeon: Dmitriy Barrios MD;  Location: [facility]     ENT SURGERY      EPISTAXIS POSTERIOR SURGERY     LASIK       REPAIR TENDON FOOT  7/23/2012    Procedure: REPAIR TENDON FOOT;  Brostrom Repair;  Surgeon: Jad Braun MD;  Location: [facility]     VASECTOMY         No family history on file.    Social History     Tobacco Use     Smoking status: Never Smoker     Smokeless tobacco: Never Used   Substance Use Topics     Alcohol use: Yes     Comment: 0 - 2 PER WEEK         Current Outpatient Medications:      albuterol (PROAIR HFA/PROVENTIL HFA/VENTOLIN HFA) 108 (90 Base) MCG/ACT inhaler, Inhale 2 puffs into the lungs every 4 hours as needed (Cough), Disp: 1 Inhaler, Rfl: 0     benzonatate (TESSALON) 100 MG capsule, Take 1 capsule (100 mg) by mouth 3 times daily as needed for cough, Disp: 25 capsule, Rfl: 0     colchicine (COLCRYS) 0.6 MG  tablet, Take 1 tablet (0.6 mg) by mouth daily as needed for moderate pain (Patient not taking: Reported on 10/28/2020), Disp: 30 tablet, Rfl: 1     dexamethasone (DECADRON) 2 MG tablet, Take 5 tablets (10 mg) by mouth once for 1 dose, Disp: 5 tablet, Rfl: 0     guaiFENesin-codeine (ROBITUSSIN AC) 100-10 MG/5ML solution, Take 5-10 mLs by mouth every 4 hours as needed for cough, Disp: 118 mL, Rfl: 0     naproxen (NAPROSYN) 500 MG tablet, Take 1 pill twice per day with food for 10 days then every 12 hours as needed (Patient not taking: Reported on 10/28/2020), Disp: 60 tablet, Rfl: 0     rosuvastatin (CRESTOR) 20 MG tablet, Take 1 tablet (20 mg) by mouth daily, Disp: 90 tablet, Rfl: 1       Allergies   Allergen Reactions     Nka [No Known Allergies]        BP (!) 128/90   Pulse 90   Wt 90.7 kg (200 lb)   BMI 32.78 kg/m  .      Constitutional/ general:  Pt is in no apparent distress, appears well-nourished.  Cooperative with history and physical exam.     Psych:  The patient answered questions appropriately.  Normal affect.  Seems to have reasonable expectations, in terms of treatment.     Lungs:  Non labored breathing, non labored speech. No cough.  No audible wheezing. Even, quiet breathing.       Vascular:  Pedal pulses are palpable bilaterally for both the DP and PT arteries.  CFT < 3 sec.  No ankle varicosities or edema.  Pedal hair growth noted.     Neuro:  Alert and oriented x 3. Coordinated gait.  Light touch sensation is intact     Derm: Normal texture and turgor.  No ecchymosis, or cyanosis.  Hair growth noted.        Musculoskeletal:     Patient is ambulatory without an assistive device or brace.  No gross deformities.  Normal arch with weightbearing.  No forefoot or rear foot deformities noted.  MS 5/5 all compartments.  Normal ROM all fore foot and rearfoot joints.  No equinus.  right great toe nail lateral border shows soft tissue impingement with localized erythema.   negative active drainage/purulence  at this time.  No sinus tracts.  No nailbed masses or exostosis.  No pain with range of motion of IPJ or MTPJ.  No ascending cellulitis.    ASSESSMENT:    Onychocryptosis with paronychia right toe.    Discussed etiology and treatment options in detail w/ the pt.  The potential causes and nature of an ingrown toenail were discussed with the patient.  We reviewed the natural history/prognosis of the condition and potential risks if no treatment is provided.      Treatment options discussed included conservative management (oral antibiotics, soaking of foot, adequate width shoes)  as well as surgical management (partial or total nail removal).  The pros and cons of both forms of treatment were reviewed.  Handout given to patient.      After thorough discussion and answering all questions, the patient elected to have nail avulsion.  Obtained consent, used 3cc of 1% lidocaine plain to block right first toe.  Sterile prep, then avulsed the affected border(s).  No evidence of deep abscess noted.  Pt tolerated procedure well.  Sterile bandage placed, gave wound care instruction.  Instructed patient on trimming nails correctly.  They will avoid tight shoes.  Avoid pedicures.  Discussed permanent removal of border if chronic problem.  Return to clinic prn.    Dmitriy Gibson DPM, FACFAS

## 2022-04-05 NOTE — PATIENT INSTRUCTIONS
We wish you continued good healing. If you have any questions or concerns, please do not hesitate to contact us at  [phone]    [employer] (secure e-mail communication and access to your chart) to send a message or to make an appointment.    Please remember to call and schedule a follow up appointment if one was recommended at your earliest convenience.     PODIATRY CLINIC HOURS  TELEPHONE NUMBER    Dr. Dmitriy VANNPBROOKLYNN [facility]        Clinics:  Andrea Carmen CMA   Tuesday 1PM-6PM  [place]/Andrea  Wednesday 745AM-330PM  [place]isa  Thursday/Friday 745AM-230PM  [place] P[place]/ANDREA APPOINTMENTS  [phone]    [place] APPOINTMENTS  [phone]          If you need a medication refill, please contact us you may need lab work and/or a follow up visit prior to your refill (i.e. Antifungal medications).    If MRI needed please call Imaging at [phone] or [phone]    HOW DO I GET MY KNEE SCOOTER? Knee scooters can be picked up at ANY Medical Supply stores with your knee scooter Prescription.  OR    Bring your signed prescription to an [facility] showroom.          INGROWN TOENAIL REMOVAL AFTERCARE       Go directly home and elevate the affected foot on one or two pillows for the remainder of the day/evening if possible. Your toe may stay numb anywhere from 2-8 hours.     Take Tylenol, ibuprofen or another anti-inflammatory as needed for pain.     That evening of the procedure,  you may remove the bandage.(you may soak it in warm soapy water ) After soaks, pat the area dry and then allow to airdry for a few minutes. Apply antibiotic ointment to the area and cover with  band-aid.    The following day. Find a shoe that is comfortable and minimizes the amount of rubbing on your toe, as this increases pain.    Dress with band-aids until no longer draining, typically 3 days.    Watch for any signs and symptoms of infection such  as: redness, red streaks going up the foot/leg, swelling, pus or foul odor. Fevers > 101     Please call with questions.    Dr. Dmitriy Gibson D.P.M FAC FAS

## 2022-05-08 ENCOUNTER — HEALTH MAINTENANCE LETTER (OUTPATIENT)
Age: 52
End: 2022-05-08

## 2022-07-13 ENCOUNTER — OFFICE VISIT (OUTPATIENT)
Dept: FAMILY MEDICINE | Facility: CLINIC | Age: 52
End: 2022-07-13
Payer: COMMERCIAL

## 2022-07-13 VITALS
HEIGHT: 66 IN | BODY MASS INDEX: 32.14 KG/M2 | RESPIRATION RATE: 16 BRPM | SYSTOLIC BLOOD PRESSURE: 132 MMHG | WEIGHT: 200 LBS | DIASTOLIC BLOOD PRESSURE: 88 MMHG | TEMPERATURE: 98.2 F | OXYGEN SATURATION: 98 % | HEART RATE: 86 BPM

## 2022-07-13 DIAGNOSIS — B35.1 ONYCHOMYCOSIS: ICD-10-CM

## 2022-07-13 DIAGNOSIS — M10.9 GOUT INVOLVING TOE, UNSPECIFIED CAUSE, UNSPECIFIED CHRONICITY, UNSPECIFIED LATERALITY: ICD-10-CM

## 2022-07-13 DIAGNOSIS — Z82.49 FAMILY HISTORY OF ISCHEMIC HEART DISEASE: ICD-10-CM

## 2022-07-13 DIAGNOSIS — Z11.59 NEED FOR HEPATITIS C SCREENING TEST: ICD-10-CM

## 2022-07-13 DIAGNOSIS — Z00.00 ROUTINE GENERAL MEDICAL EXAMINATION AT A HEALTH CARE FACILITY: Primary | ICD-10-CM

## 2022-07-13 DIAGNOSIS — E78.5 HYPERLIPIDEMIA LDL GOAL <160: ICD-10-CM

## 2022-07-13 LAB
ALBUMIN SERPL-MCNC: 4.3 G/DL (ref 3.4–5)
ALP SERPL-CCNC: 47 U/L (ref 40–150)
ALT SERPL W P-5'-P-CCNC: 35 U/L (ref 0–70)
ANION GAP SERPL CALCULATED.3IONS-SCNC: 2 MMOL/L (ref 3–14)
AST SERPL W P-5'-P-CCNC: 21 U/L (ref 0–45)
BILIRUB SERPL-MCNC: 0.7 MG/DL (ref 0.2–1.3)
BUN SERPL-MCNC: 17 MG/DL (ref 7–30)
CALCIUM SERPL-MCNC: 9.3 MG/DL (ref 8.5–10.1)
CHLORIDE BLD-SCNC: 107 MMOL/L (ref 94–109)
CHOLEST SERPL-MCNC: 219 MG/DL
CO2 SERPL-SCNC: 31 MMOL/L (ref 20–32)
CREAT SERPL-MCNC: 1.06 MG/DL (ref 0.66–1.25)
FASTING STATUS PATIENT QL REPORTED: YES
GFR SERPL CREATININE-BSD FRML MDRD: 84 ML/MIN/1.73M2
GLUCOSE BLD-MCNC: 106 MG/DL (ref 70–99)
HDLC SERPL-MCNC: 44 MG/DL
LDLC SERPL CALC-MCNC: 130 MG/DL
NONHDLC SERPL-MCNC: 175 MG/DL
POTASSIUM BLD-SCNC: 4.2 MMOL/L (ref 3.4–5.3)
PROT SERPL-MCNC: 7.6 G/DL (ref 6.8–8.8)
SODIUM SERPL-SCNC: 140 MMOL/L (ref 133–144)
TRIGL SERPL-MCNC: 227 MG/DL
URATE SERPL-MCNC: 8.2 MG/DL (ref 3.5–7.2)

## 2022-07-13 PROCEDURE — 90471 IMMUNIZATION ADMIN: CPT | Performed by: PHYSICIAN ASSISTANT

## 2022-07-13 PROCEDURE — 80053 COMPREHEN METABOLIC PANEL: CPT | Performed by: PHYSICIAN ASSISTANT

## 2022-07-13 PROCEDURE — 36415 COLL VENOUS BLD VENIPUNCTURE: CPT | Performed by: PHYSICIAN ASSISTANT

## 2022-07-13 PROCEDURE — 90750 HZV VACC RECOMBINANT IM: CPT | Performed by: PHYSICIAN ASSISTANT

## 2022-07-13 PROCEDURE — 84550 ASSAY OF BLOOD/URIC ACID: CPT | Performed by: PHYSICIAN ASSISTANT

## 2022-07-13 PROCEDURE — 99214 OFFICE O/P EST MOD 30 MIN: CPT | Mod: 25 | Performed by: PHYSICIAN ASSISTANT

## 2022-07-13 PROCEDURE — 99396 PREV VISIT EST AGE 40-64: CPT | Mod: 25 | Performed by: PHYSICIAN ASSISTANT

## 2022-07-13 PROCEDURE — 80061 LIPID PANEL: CPT | Performed by: PHYSICIAN ASSISTANT

## 2022-07-13 PROCEDURE — 86803 HEPATITIS C AB TEST: CPT | Performed by: PHYSICIAN ASSISTANT

## 2022-07-13 RX ORDER — TERBINAFINE HYDROCHLORIDE 250 MG/1
250 TABLET ORAL DAILY
Qty: 30 TABLET | Refills: 0 | Status: SHIPPED | OUTPATIENT
Start: 2022-07-13 | End: 2023-05-17

## 2022-07-13 RX ORDER — COLCHICINE 0.6 MG/1
0.6 TABLET ORAL DAILY PRN
Qty: 30 TABLET | Refills: 1 | Status: SHIPPED | OUTPATIENT
Start: 2022-07-13

## 2022-07-13 ASSESSMENT — ENCOUNTER SYMPTOMS
JOINT SWELLING: 0
COUGH: 0
HEARTBURN: 0
NERVOUS/ANXIOUS: 0
EYE PAIN: 0
HEMATOCHEZIA: 0
FEVER: 0
ABDOMINAL PAIN: 0
HEMATURIA: 0
CHILLS: 0
WEAKNESS: 0
DIARRHEA: 0
PARESTHESIAS: 0
ARTHRALGIAS: 0
FREQUENCY: 0
MYALGIAS: 0
SORE THROAT: 0
SHORTNESS OF BREATH: 0
HEADACHES: 0
DIZZINESS: 0
NAUSEA: 0
DYSURIA: 0
CONSTIPATION: 0
PALPITATIONS: 0

## 2022-07-13 ASSESSMENT — PAIN SCALES - GENERAL: PAINLEVEL: NO PAIN (0)

## 2022-07-13 NOTE — NURSING NOTE
Prior to immunization administration, verified patients identity using patient s name and date of birth. Please see Immunization Activity for additional information.     Screening Questionnaire for Adult Immunization    Are you sick today?   No   Do you have allergies to medications, food, a vaccine component or latex?   No   Have you ever had a serious reaction after receiving a vaccination?   No   Do you have a long-term health problem with heart, lung, kidney, or metabolic disease (e.g., diabetes), asthma, a blood disorder, no spleen, complement component deficiency, a cochlear implant, or a spinal fluid leak?  Are you on long-term aspirin therapy?   No   Do you have cancer, leukemia, HIV/AIDS, or any other immune system problem?   No   Do you have a parent, brother, or sister with an immune system problem?   No   In the past 3 months, have you taken medications that affect  your immune system, such as prednisone, other steroids, or anticancer drugs; drugs for the treatment of rheumatoid arthritis, Crohn s disease, or psoriasis; or have you had radiation treatments?   No   Have you had a seizure, or a brain or other nervous system problem?   No   During the past year, have you received a transfusion of blood or blood    products, or been given immune (gamma) globulin or antiviral drug?   No   For women: Are you pregnant or is there a chance you could become       pregnant during the next month?   No   Have you received any vaccinations in the past 4 weeks?   No     Immunization questionnaire answers were all negative.        Per orders of Dr. Lockwood, injection of Shingrix given by Gabriella Moore MA. Patient instructed to remain in clinic for 15 minutes afterwards, and to report any adverse reaction to me immediately.       Screening performed by Gabriella Moore MA on 7/13/2022 at 9:44 AM.

## 2022-07-13 NOTE — PROGRESS NOTES
SUBJECTIVE:   CC: Lance Ramos is an 51 year old male who presents for preventative health visit.       Patient has been advised of split billing requirements and indicates understanding: Yes  Healthy Habits:     Getting at least 3 servings of Calcium per day:  Yes    Bi-annual eye exam:  NO    Dental care twice a year:  Yes    Sleep apnea or symptoms of sleep apnea:  Excessive snoring    Diet:  Regular (no restrictions)    Frequency of exercise:  6-7 days/week    Duration of exercise:  45-60 minutes    Taking medications regularly:  Yes    PHQ-2 Total Score: 0    Additional concerns today:  No    Wonders about heart testing.  Non-smoker.  He does have a family history of heart disease (brother is on medication, he thinks his father had an MI, he  of pancreatic cancer).  He denies any symptoms.  Cholesterol levels have been high in the past and he was on lipitor in the past, however he did not want to take it when he was younger.  He is more open to restarting this now.     He has a h/o gout.  Has tried allopurinolol in the past, however this seemed to increase flare-ups.  He does well with colchicine just PRN.  No gout flare-up in the past year.     He c/o nail fungus on right 3rd digit.  No meds tried, he is interested in treatment.             Today's PHQ-2 Score:   PHQ-2 (  [employer]) 2022   Q1: Little interest or pleasure in doing things 0   Q2: Feeling down, depressed or hopeless 0   PHQ-2 Score 0   PHQ-2 Total Score (12-17 Years)- Positive if 3 or more points; Administer PHQ-A if positive -   Q1: Little interest or pleasure in doing things Not at all   Q2: Feeling down, depressed or hopeless Not at all   PHQ-2 Score 0       Abuse: Current or Past(Physical, Sexual or Emotional)- No  Do you feel safe in your environment? Yes    Have you ever done Advance Care Planning? (For example, a Health Directive, POLST, or a discussion with a medical provider or your loved ones about your wishes): No, advance  care planning information given to patient to review.  Patient declined advance care planning discussion at this time.    Social History     Tobacco Use     Smoking status: Never Smoker     Smokeless tobacco: Never Used   Substance Use Topics     Alcohol use: Yes     Comment: 0 - 2 PER WEEK         Alcohol Use 7/13/2022   Prescreen: >3 drinks/day or >7 drinks/week? Yes   Prescreen: >3 drinks/day or >7 drinks/week? -   AUDIT SCORE  5       Last PSA:   PSA   Date Value Ref Range Status   01/08/2020 1.40 0 - 4 ug/L Final     Comment:     Assay Method:  Chemiluminescence using Siemens Vista analyzer       Reviewed orders with patient. Reviewed health maintenance and updated orders accordingly - Yes  Lab work is in process  Labs reviewed in EPIC  BP Readings from Last 3 Encounters:   07/13/22 132/88   04/05/22 (!) 128/90   09/23/20 120/84    Wt Readings from Last 3 Encounters:   07/13/22 90.7 kg (200 lb)   04/05/22 90.7 kg (200 lb)   09/23/20 93.6 kg (206 lb 6.4 oz)                    Reviewed and updated as needed this visit by clinical staff   Tobacco   Meds                Reviewed and updated as needed this visit by Provider                       Review of Systems   Constitutional: Negative for chills and fever.   HENT: Negative for congestion, ear pain, hearing loss and sore throat.    Eyes: Negative for pain and visual disturbance.   Respiratory: Negative for cough and shortness of breath.    Cardiovascular: Negative for chest pain, palpitations and peripheral edema.   Gastrointestinal: Negative for abdominal pain, constipation, diarrhea, heartburn, hematochezia and nausea.   Genitourinary: Negative for dysuria, frequency, genital sores, hematuria, impotence, penile discharge and urgency.   Musculoskeletal: Negative for arthralgias, joint swelling and myalgias.   Skin: Negative for rash.   Neurological: Negative for dizziness, weakness, headaches and paresthesias.   Psychiatric/Behavioral: Negative for mood  "changes. The patient is not nervous/anxious.      CONSTITUTIONAL: NEGATIVE for fever, chills, change in weight  INTEGUMENTARY/SKIN: NEGATIVE for worrisome rashes, moles or lesions  EYES: NEGATIVE for vision changes or irritation  ENT: NEGATIVE for ear, mouth and throat problems  RESP: NEGATIVE for significant cough or SOB  CV: NEGATIVE for chest pain, palpitations or peripheral edema  GI: NEGATIVE for nausea, abdominal pain, heartburn, or change in bowel habits   male: negative for dysuria, hematuria, decreased urinary stream, erectile dysfunction, urethral discharge  MUSCULOSKELETAL: NEGATIVE for significant arthralgias or myalgia  NEURO: NEGATIVE for weakness, dizziness or paresthesias  PSYCHIATRIC: NEGATIVE for changes in mood or affect    OBJECTIVE:   /88 (BP Location: Left arm, Patient Position: Chair, Cuff Size: Adult Large)   Pulse 86   Temp 98.2  F (36.8  C) (Tympanic)   Resp 16   Ht 1.664 m (5' 5.5\")   Wt 90.7 kg (200 lb)   SpO2 98%   BMI 32.78 kg/m      Physical Exam  GENERAL: healthy, alert and no distress  EYES: Eyes grossly normal to inspection, PERRL and conjunctivae and sclerae normal  HENT: ear canals and TM's normal, nose and mouth without ulcers or lesions  NECK: no adenopathy, no asymmetry, masses, or scars and thyroid normal to palpation  RESP: lungs clear to auscultation - no rales, rhonchi or wheezes  CV: regular rate and rhythm, normal S1 S2, no S3 or S4, no murmur, click or rub, no peripheral edema and peripheral pulses strong  ABDOMEN: soft, nontender, no hepatosplenomegaly, no masses and bowel sounds normal  MS: no gross musculoskeletal defects noted, no edema  SKIN: no suspicious lesions or rashes  Thickened yellow nail right 3rd digit only.  NEURO: Normal strength and tone, mentation intact and speech normal  PSYCH: mentation appears normal, affect normal/bright    Diagnostic Test Results:  Labs reviewed in Epic    ASSESSMENT/PLAN:   (Z00.00) Routine general medical " examination at a health care facility  (primary encounter diagnosis)  Comment:      HEALTH CARE MAINTENANCE              Reviewed USPTF recommendations and anticipatory guidance.              See orders.   Shingles vaccine given, he did confirm he wanted it done in clinic (not the pharmacy).     I also recommend a covid booster in a month.   Discussed screening for prostate cancer, which has changed in the past few years.      Routine screening with digital rectal exam no longer recommended.   Routine screening with PSA remains controversial.   The meaning of a false positive PSA and a false negative PSA has been discussed, and the patient indicates his understanding of the limitations of this screening test.    Recommendations for screening are to monitor for symptoms (such as blood in the urine, changes in urinary flow, frequent urination at night, etc).      Patient opts to monitor symptoms.       (Z11.59) Need for hepatitis C screening test  Comment:  Discussed CDC guidelines on preventative screening for this condition.    Patient would like screening done.      Plan: Hepatitis C Screen Reflex to HCV RNA Quant and         Genotype           (B35.1) Onychomycosis  Comment: onychomycosis     We had a discussion about this diagnosis and went over the various aspects to consider. Any treatment is optional. Penlac nail lacquer was discussed with patient, which does not affect the liver, however is less effective than other treatments and expensive.   Terbinafine (Lamisil) is effective, inexpensive, may have liver toxicity, and is usually not covered by insurance for this indication.  After discussion the patient has decided to accept an Rx for Lamisil.    I have advised patient to keep area clean and dry, avoid tight or ill-fitting footwear, wear absorbent cotton socks, and wash clothing/towels in hot water.      Patient advised to follow-up in clinic if symptoms worsen or fail to improve as anticipated.  "        Plan: terbinafine (LAMISIL) 250 MG tablet            (Z82.49) Family history of ischemic heart disease  Comment: further evaluation with Coronary calcium scan requested by patient.  Given family history and long standing high cholesterol (untreated), I think this is reasonable.  If mild/moderate calcifications noted, would recommend starting a statin.  If severe calcifications noted, this would prompt referral to cardiologist.  He is asymptomatic today.   Plan: CT Coronary Calcium Scan, Lipid panel reflex to        direct LDL Fasting            (E78.5) Hyperlipidemia LDL goal <160  Comment: Will likely plan to restart statin after he finishes lamisil.    Plan: CT Coronary Calcium Scan, Lipid panel reflex to        direct LDL Fasting, Comprehensive metabolic         panel (BMP + Alb, Alk Phos, ALT, AST, Total.         Bili, TP)            (M10.9) Gout involving toe, unspecified cause, unspecified chronicity, unspecified laterality  Comment: Will recheck level, but prefers to only use colchicine PRN.  Does not want preventative medication with allopurinol..   Plan: Uric acid              Patient has been advised of split billing requirements and indicates understanding: Yes    COUNSELING:   Reviewed preventive health counseling, as reflected in patient instructions       Regular exercise       Healthy diet/nutrition       Consider Hep C screening for all patients one time for ages 18-79 years       Prostate cancer screening    Estimated body mass index is 32.78 kg/m  as calculated from the following:    Height as of this encounter: 1.664 m (5' 5.5\").    Weight as of this encounter: 90.7 kg (200 lb).     Weight management plan: Discussed healthy diet and exercise guidelines    He reports that he has never smoked. He has never used smokeless tobacco.      Counseling Resources:  ATP IV Guidelines  Pooled Cohorts Equation Calculator  FRAX Risk Assessment  ICSI Preventive Guidelines  Dietary Guidelines for " Americans, 2010  USDA's MyPlate  ASA Prophylaxis  Lung CA Screening    Mona Lockwood PA-C  M [facility] DAWIT

## 2022-07-13 NOTE — PATIENT INSTRUCTIONS
Recheck liver tests in 1 month (while on lamisil).  Will use this for 3 months total.     Will consider starting lipitor after you complete the lamsil.  Reach out when you are ready to start that medication and I'll send to your pharmacy.     I do recommend a covid booster in a month or so.  Will do shingles vaccine today.     Call [phone] for the Coronary calcium CT scan    Preventive Health Recommendations  Male Ages 50 - 64    Yearly exam:             See your health care provider every year in order to  o   Review health changes.   o   Discuss preventive care.    o   Review your medicines if your doctor has prescribed any.   Have a cholesterol test every 5 years, or more frequently if you are at risk for high cholesterol/heart disease.   Have a diabetes test (fasting glucose) every three years. If you are at risk for diabetes, you should have this test more often.   Have a colonoscopy at age 50, or have a yearly FIT test (stool test). These exams will check for colon cancer.    Talk with your health care provider about whether or not a prostate cancer screening test (PSA) is right for you.  You should be tested each year for STDs (sexually transmitted diseases), if you re at risk.     Shots: Get a flu shot each year. Get a tetanus shot every 10 years.     Nutrition:  Eat at least 5 servings of fruits and vegetables daily.   Eat whole-grain bread, whole-wheat pasta and brown rice instead of white grains and rice.   Get adequate Calcium and Vitamin D.     Lifestyle  Exercise for at least 150 minutes a week (30 minutes a day, 5 days a week). This will help you control your weight and prevent disease.   Limit alcohol to one drink per day.   No smoking.   Wear sunscreen to prevent skin cancer.   See your dentist every six months for an exam and cleaning.   See your eye doctor every 1 to 2 years.

## 2022-07-14 LAB — HCV AB SERPL QL IA: NONREACTIVE

## 2022-07-15 ENCOUNTER — ANCILLARY PROCEDURE (OUTPATIENT)
Dept: CT IMAGING | Facility: CLINIC | Age: 52
End: 2022-07-15
Attending: PHYSICIAN ASSISTANT

## 2022-07-15 DIAGNOSIS — E78.5 HYPERLIPIDEMIA LDL GOAL <160: ICD-10-CM

## 2022-07-15 DIAGNOSIS — Z82.49 FAMILY HISTORY OF ISCHEMIC HEART DISEASE: ICD-10-CM

## 2022-07-15 PROCEDURE — 75571 CT HRT W/O DYE W/CA TEST: CPT | Performed by: INTERNAL MEDICINE

## 2022-07-19 ENCOUNTER — MYC MEDICAL ADVICE (OUTPATIENT)
Dept: FAMILY MEDICINE | Facility: CLINIC | Age: 52
End: 2022-07-19

## 2022-07-19 DIAGNOSIS — Z12.5 SCREENING FOR PROSTATE CANCER: Primary | ICD-10-CM

## 2022-12-15 ENCOUNTER — LAB (OUTPATIENT)
Dept: LAB | Facility: CLINIC | Age: 52
End: 2022-12-15
Payer: COMMERCIAL

## 2022-12-15 ENCOUNTER — ALLIED HEALTH/NURSE VISIT (OUTPATIENT)
Dept: FAMILY MEDICINE | Facility: CLINIC | Age: 52
End: 2022-12-15
Payer: COMMERCIAL

## 2022-12-15 DIAGNOSIS — Z23 VACCINE FOR SINGLE BACTERIAL DISEASE: Primary | ICD-10-CM

## 2022-12-15 DIAGNOSIS — Z12.5 SCREENING FOR PROSTATE CANCER: ICD-10-CM

## 2022-12-15 LAB — PSA SERPL-MCNC: 1.23 UG/L (ref 0–4)

## 2022-12-15 PROCEDURE — 99207 PR NO CHARGE NURSE ONLY: CPT

## 2022-12-15 PROCEDURE — 90471 IMMUNIZATION ADMIN: CPT

## 2022-12-15 PROCEDURE — 36415 COLL VENOUS BLD VENIPUNCTURE: CPT

## 2022-12-15 PROCEDURE — 90750 HZV VACC RECOMBINANT IM: CPT

## 2022-12-15 PROCEDURE — G0103 PSA SCREENING: HCPCS

## 2023-02-21 ENCOUNTER — OFFICE VISIT (OUTPATIENT)
Dept: PODIATRY | Facility: CLINIC | Age: 53
End: 2023-02-21
Payer: COMMERCIAL

## 2023-02-21 VITALS — HEART RATE: 96 BPM | SYSTOLIC BLOOD PRESSURE: 140 MMHG | DIASTOLIC BLOOD PRESSURE: 88 MMHG

## 2023-02-21 DIAGNOSIS — L60.0 INGROWING NAIL: Primary | ICD-10-CM

## 2023-02-21 PROCEDURE — 99213 OFFICE O/P EST LOW 20 MIN: CPT | Mod: 25 | Performed by: PODIATRIST

## 2023-02-21 PROCEDURE — 11750 EXCISION NAIL&NAIL MATRIX: CPT | Mod: T5 | Performed by: PODIATRIST

## 2023-02-21 NOTE — PROGRESS NOTES
Subjective:    Pt is seen today w/ the c/c of a painful ingrown right great nail lateral border.  This has been problematic for several month(s). negativehistory of drainage from the site. This is slowly getting worse.  Aggravated by activity and relieved by rest.  Has tried soaking which has not helped.  Last year had temporary here.  In martial arts.  Would like permanent.      ROS:  see above         Allergies   Allergen Reactions     Nka [No Known Allergies]        Current Outpatient Medications   Medication Sig Dispense Refill     colchicine (COLCRYS) 0.6 MG tablet Take 1 tablet (0.6 mg) by mouth daily as needed for moderate pain (Patient not taking: Reported on 2/21/2023) 30 tablet 1     terbinafine (LAMISIL) 250 MG tablet Take 1 tablet (250 mg) by mouth daily (Patient not taking: Reported on 2/21/2023) 30 tablet 0       Patient Active Problem List   Diagnosis     CARDIOVASCULAR SCREENING; LDL GOAL LESS THAN 160     Hyperlipidemia LDL goal <160     Pain in toes of both feet     Bilateral bunions     Acute gouty arthritis     Gout involving toe, unspecified cause, unspecified chronicity, unspecified laterality     Trigger finger, right middle finger     Onychomycosis     Family history of ischemic heart disease       Past Medical History:   Diagnosis Date     Ankle joint pain      Epistaxis      Hyperlipidemia      Snores        Past Surgical History:   Procedure Laterality Date     ARTHROSCOPY ANKLE  3/28/2012    Procedure:ARTHROSCOPY ANKLE; ANKLE ARTHROSCOPY, DEBRIDEMENT, LOOSE BODY EXCISION  (C-ARM); Surgeon:JAD BRAUN; Location:[facility]     ARTHROSCOPY ANKLE, OPEN REPAIR TENDON, COMBINED  7/23/2012    Procedure: COMBINED ARTHROSCOPY ANKLE, OPEN REPAIR TENDON;  LEFT ANKLE ARTHROSCOPY AND BROSTROM REPAIR ;  Surgeon: Jad Braun MD;  Location: [facility]     COLONOSCOPY N/A 3/5/2020    Procedure: COLONOSCOPY, WITH POLYPECTOMY AND BIOPSY;  Surgeon: Dmitriy Barrios MD;  Location: [facility]     ENT SURGERY       EPISTAXIS POSTERIOR SURGERY     LASIK       REPAIR TENDON FOOT  7/23/2012    Procedure: REPAIR TENDON FOOT;  Brostrom Repair;  Surgeon: Sophy Braun MD;  Location: [facility]     VASECTOMY         Family History   Problem Relation Age of Onset     Cerebrovascular Disease Father      Pancreatic Cancer Father      Valvular heart disease Brother      Deep Vein Thrombosis Brother 26        triggered by ankle injury     Hyperlipidemia Brother        Social History     Tobacco Use     Smoking status: Never     Smokeless tobacco: Never   Substance Use Topics     Alcohol use: Yes     Comment: 0 - 2 PER WEEK         Exam:    Vitals: BP (!) 140/88   Pulse 96   BMI: There is no height or weight on file to calculate BMI.  Height: Data Unavailable    Constitutional/ general:  Pt is in no apparent distress, appears well-nourished.  Cooperative with history and physical exam.     Psych:  The patient answered questions appropriately.  Normal affect.  Seems to have reasonable expectations, in terms of treatment.     Lungs:  Non labored breathing, non labored speech. No cough.  No audible wheezing. Even, quiet breathing.       Vascular:  positive pedal pulses bilaterally for both the DP and PT arteries.  CFT < 3 sec.  negative ankle edema.  positive pedal hair growth.    Neuro:  Alert and oriented x 3. Coordinated gait.  Light touch sensation is intact     Derm: Normal texture and turgor.  No erythema, ecchymosis, or cyanosis.      Musculoskeletal:    right great toe nail lateral border shows soft tissue impingement with localized erythema.   negative active drainage/purulence at this time.  No sinus tracts.  No nailbed masses or exostosis.  No pain with range of motion of IPJ or MTPJ.  No ascending cellulitis.    ASSESSMENT:    Onychocryptosis with paronychia right great lateral border.    Discussed etiology and treatment options in detail w/ the pt.  The potential causes and nature of an ingrown toenail were discussed with  the patient.  We reviewed the natural history/prognosis of the condition and potential risks if no treatment is provided.      After thorough discussion and answering all questions, the patient elected to have permanent removal of affected nail border.  Risk complications and efficacy discussed with patient.  Obtained consent, used 3cc of 1% lidocaine plain to block right great toe.  Sterile prep, then avulsed the affected border.  No evidence of deep abscess noted.  Phenol was applied times 3 at 3o second intervals with curettage in between and then alcohol rinse.  Pt tolerated procedure well.  Sterile bandage placed, gave wound care instruction.  Return to clinic prn    Dmitriy Gibson DPM, FACFAS

## 2023-02-21 NOTE — PATIENT INSTRUCTIONS
We wish you continued good healing. If you have any questions or concerns, please do not hesitate to contact us at  [phone]    [employer] (secure e-mail communication and access to your chart) to send a message or to make an appointment.    Please remember to call and schedule a follow up appointment if one was recommended at your earliest convenience.     PODIATRY CLINIC HOURS  TELEPHONE NUMBER    Dr. Dmitriy VANNPBROOKLYNN FACFAS        Clinics:  Andrea Carmen [facility]   [place]  Tuesday 1PM-6PM  [place]  Wednesday 745AM-330PM  [place]  Thursday/Friday 745AM-230PM       [place] APPOINTMENTS  [phone]    [place] APPOINTMENTS  [phone]        If you need a medication refill, please contact us you may need lab work and/or a follow up visit prior to your refill (i.e. Antifungal medications).  If MRI needed please call Imaging at [phone]   HOW DO I GET MY KNEE SCOOTER? Knee scooters can be picked up at ANY Medical Supply stores with your knee scooter Prescription.  OR  Bring your signed prescription to an [facility] showroom.         INGROWN TOENAIL REMOVAL AFTERCARE ~PERMANENT NAIL REMOVAL    Go directly home and elevate the affected foot for the remainder of the day/evening if possible. Your toe may stay numb anywhere from 2-8 hours.   For pain take Tylenol, ibuprofen or another anti-inflammatory as needed for pain. You may apply ice on top of your foot behind he base of the toes, BUT, NOT ON IT.  That evening of the procedure, or morning after procedure.  you may remove the bandage. Began soaking foot three times a day (10-15 min each time) in lukewarm water with a pinch of salt. Epson or table salt   You may have  to do this for 6-8 weeks if Phenol was used during the procedure.  After soaks, pat the area dry. Apply antibiotic ointment to the area and cover with a band-aid.  The following day. Find a shoe that is  comfortable and minimizes the amount of rubbing on your toe, as this increases pain.  Dress with band-aids until no longer draining. Those that have had the phenol procedure, the toe will drain longer and will look like it is infected because it is a chemical burn.    Watch for any signs and symptoms of infection such as: redness, red streaks going up the foot/leg, swelling, pus or foul odor. Fevers > 101   Please call with questions.    Dr. Dmitriy Gibson D.P.M FAC FAS

## 2023-04-23 ENCOUNTER — HEALTH MAINTENANCE LETTER (OUTPATIENT)
Age: 53
End: 2023-04-23

## 2023-05-15 NOTE — PROGRESS NOTES
"  Assessment & Plan     Concentration deficit  Order placed for formal testing.  - Adult Mental Health  Referral; Future         BMI:   Estimated body mass index is 32.61 kg/m  as calculated from the following:    Height as of this encounter: 1.664 m (5' 5.5\").    Weight as of this encounter: 90.3 kg (199 lb).       SHIVA Otero CNP  [facility] DAWIT Coley is a 52 year old, presenting for the following health issues:  A.D.H.D (Testing)      History of Present Illness       Reason for visit:  Attention deficit  DISORDER ADD    He eats 2-3 servings of fruits and vegetables daily.He consumes 0 sweetened beverage(s) daily.He exercises with enough effort to increase his heart rate 30 to 60 minutes per day.  He exercises with enough effort to increase his heart rate 5 days per week.   He is taking medications regularly.                                                                                                         Some-                                                                        Never   Rarely      times       Often  Very Often  1. How often do you have trouble wrapping up the final details of a project,  once the challenging parts have been done?   x     2. How often do you have difficulty getting things in order when you have to do a task that requires organization?     x   3. How often do you have problems remembering appointments or obligations?    x    4. When you have a task that requires a lot of thought, how often do you avoid or delay getting started?   x     5. How often do you fidget or squirm with your hands or feet when you have to sit down for a long time?     x   6. How often do you feel overly active and compelled to do things, like you were driven by a motor?   x       Part A                                                        7. How often do you make careless mistakes when you have to work on a boring or difficult project?   x     8. " "How often do you have difficulty keeping your attention when you are doing boring or repetitive work?     x   9. How often do you have difficulty concentrating on what people say to you, even when they are speaking to you directly?    x    10. How often do you misplace or have difficulty finding things at home or at work?    x    11. How often are you distracted by activity or noise around you?    x    12. How often do you leave your seat in meetings or other situations in which you are expected to remain seated?       x   13. How often do you feel restless or fidgety?    x      14. How often do you have difficulty unwinding and relaxing when you have time to yourself?     x   15. How often do you find yourself talking too much when you are in social situations?    x    16. When you're in a conversation, how often do you find yourself finishing the sentences of the people you are talking to, before they can finish them themselves?  x      17. How often do you have difficulty waiting your turn in situations when turn-taking is required?   x     18. How often do you interrupt others when they are busy?    x        Thinks has lack of focus and disengagment.  Had to have shoulder surgery noticed that when takes narcotics brain slowed down and was able to put thoughts together more. Feels like gets distracted easily. Has always been fidgiging. Feels like it is interferring with work and is not organized at all. When is taking instructions will be distracted.       Review of Systems   Psychiatric/Behavioral: Positive for decreased concentration.         Objective    /78   Pulse 82   Temp 97.4  F (36.3  C) (Tympanic)   Resp 12   Ht 1.664 m (5' 5.5\")   Wt 90.3 kg (199 lb)   SpO2 97%   BMI 32.61 kg/m    Body mass index is 32.61 kg/m .  Physical Exam  Constitutional:       General: He is not in acute distress.     Appearance: Normal appearance. He is not toxic-appearing.   HENT:      Nose: No congestion.   Eyes: "      General: Lids are normal.   Pulmonary:      Effort: Pulmonary effort is normal. No tachypnea, bradypnea or respiratory distress.   Skin:     Coloration: Skin is not ashen, cyanotic, jaundiced or pale.   Neurological:      Mental Status: He is alert.   Psychiatric:         Mood and Affect: Mood normal.         Speech: Speech normal.         Behavior: Behavior normal. Behavior is cooperative.

## 2023-05-17 ENCOUNTER — OFFICE VISIT (OUTPATIENT)
Dept: FAMILY MEDICINE | Facility: CLINIC | Age: 53
End: 2023-05-17
Payer: COMMERCIAL

## 2023-05-17 VITALS
OXYGEN SATURATION: 97 % | HEART RATE: 82 BPM | BODY MASS INDEX: 31.98 KG/M2 | RESPIRATION RATE: 12 BRPM | SYSTOLIC BLOOD PRESSURE: 136 MMHG | DIASTOLIC BLOOD PRESSURE: 78 MMHG | WEIGHT: 199 LBS | TEMPERATURE: 97.4 F | HEIGHT: 66 IN

## 2023-05-17 DIAGNOSIS — R41.840 CONCENTRATION DEFICIT: Primary | ICD-10-CM

## 2023-05-17 PROCEDURE — 99213 OFFICE O/P EST LOW 20 MIN: CPT | Performed by: NURSE PRACTITIONER

## 2023-05-17 ASSESSMENT — PAIN SCALES - GENERAL: PAINLEVEL: NO PAIN (0)

## 2023-05-17 ASSESSMENT — ENCOUNTER SYMPTOMS: DECREASED CONCENTRATION: 1

## 2023-06-13 ENCOUNTER — PATIENT OUTREACH (OUTPATIENT)
Dept: CARE COORDINATION | Facility: CLINIC | Age: 53
End: 2023-06-13
Payer: COMMERCIAL

## 2023-06-27 ENCOUNTER — PATIENT OUTREACH (OUTPATIENT)
Dept: CARE COORDINATION | Facility: CLINIC | Age: 53
End: 2023-06-27
Payer: COMMERCIAL

## 2023-09-24 ENCOUNTER — HEALTH MAINTENANCE LETTER (OUTPATIENT)
Age: 53
End: 2023-09-24

## 2024-01-04 ENCOUNTER — VIRTUAL VISIT (OUTPATIENT)
Dept: PSYCHOLOGY | Facility: CLINIC | Age: 54
End: 2024-01-04
Attending: NURSE PRACTITIONER
Payer: COMMERCIAL

## 2024-01-04 DIAGNOSIS — R41.840 CONCENTRATION DEFICIT: ICD-10-CM

## 2024-01-04 PROCEDURE — 90834 PSYTX W PT 45 MINUTES: CPT | Mod: 95 | Performed by: PSYCHOLOGIST

## 2024-01-04 ASSESSMENT — ANXIETY QUESTIONNAIRES
7. FEELING AFRAID AS IF SOMETHING AWFUL MIGHT HAPPEN: NOT AT ALL
IF YOU CHECKED OFF ANY PROBLEMS ON THIS QUESTIONNAIRE, HOW DIFFICULT HAVE THESE PROBLEMS MADE IT FOR YOU TO DO YOUR WORK, TAKE CARE OF THINGS AT HOME, OR GET ALONG WITH OTHER PEOPLE: NOT DIFFICULT AT ALL
5. BEING SO RESTLESS THAT IT IS HARD TO SIT STILL: SEVERAL DAYS
GAD7 TOTAL SCORE: 9
GAD7 TOTAL SCORE: 9
7. FEELING AFRAID AS IF SOMETHING AWFUL MIGHT HAPPEN: NOT AT ALL
GAD7 TOTAL SCORE: 9
2. NOT BEING ABLE TO STOP OR CONTROL WORRYING: MORE THAN HALF THE DAYS
5. BEING SO RESTLESS THAT IT IS HARD TO SIT STILL: SEVERAL DAYS
1. FEELING NERVOUS, ANXIOUS, OR ON EDGE: MORE THAN HALF THE DAYS
GAD7 TOTAL SCORE: 9
GAD7 TOTAL SCORE: 9
7. FEELING AFRAID AS IF SOMETHING AWFUL MIGHT HAPPEN: NOT AT ALL
3. WORRYING TOO MUCH ABOUT DIFFERENT THINGS: MORE THAN HALF THE DAYS
2. NOT BEING ABLE TO STOP OR CONTROL WORRYING: MORE THAN HALF THE DAYS
8. IF YOU CHECKED OFF ANY PROBLEMS, HOW DIFFICULT HAVE THESE MADE IT FOR YOU TO DO YOUR WORK, TAKE CARE OF THINGS AT HOME, OR GET ALONG WITH OTHER PEOPLE?: NOT DIFFICULT AT ALL
1. FEELING NERVOUS, ANXIOUS, OR ON EDGE: MORE THAN HALF THE DAYS
GAD7 TOTAL SCORE: 9
IF YOU CHECKED OFF ANY PROBLEMS ON THIS QUESTIONNAIRE, HOW DIFFICULT HAVE THESE PROBLEMS MADE IT FOR YOU TO DO YOUR WORK, TAKE CARE OF THINGS AT HOME, OR GET ALONG WITH OTHER PEOPLE: NOT DIFFICULT AT ALL
6. BECOMING EASILY ANNOYED OR IRRITABLE: NOT AT ALL
4. TROUBLE RELAXING: MORE THAN HALF THE DAYS
4. TROUBLE RELAXING: MORE THAN HALF THE DAYS
8. IF YOU CHECKED OFF ANY PROBLEMS, HOW DIFFICULT HAVE THESE MADE IT FOR YOU TO DO YOUR WORK, TAKE CARE OF THINGS AT HOME, OR GET ALONG WITH OTHER PEOPLE?: NOT DIFFICULT AT ALL
6. BECOMING EASILY ANNOYED OR IRRITABLE: NOT AT ALL
3. WORRYING TOO MUCH ABOUT DIFFERENT THINGS: MORE THAN HALF THE DAYS
7. FEELING AFRAID AS IF SOMETHING AWFUL MIGHT HAPPEN: NOT AT ALL

## 2024-01-04 ASSESSMENT — COLUMBIA-SUICIDE SEVERITY RATING SCALE - C-SSRS
6. HAVE YOU EVER DONE ANYTHING, STARTED TO DO ANYTHING, OR PREPARED TO DO ANYTHING TO END YOUR LIFE?: NO
1. IN THE PAST MONTH, HAVE YOU WISHED YOU WERE DEAD OR WISHED YOU COULD GO TO SLEEP AND NOT WAKE UP?: NO
2. HAVE YOU ACTUALLY HAD ANY THOUGHTS OF KILLING YOURSELF IN THE PAST MONTH?: NO

## 2024-01-04 ASSESSMENT — PATIENT HEALTH QUESTIONNAIRE - PHQ9
10. IF YOU CHECKED OFF ANY PROBLEMS, HOW DIFFICULT HAVE THESE PROBLEMS MADE IT FOR YOU TO DO YOUR WORK, TAKE CARE OF THINGS AT HOME, OR GET ALONG WITH OTHER PEOPLE: NOT DIFFICULT AT ALL
SUM OF ALL RESPONSES TO PHQ QUESTIONS 1-9: 2
SUM OF ALL RESPONSES TO PHQ QUESTIONS 1-9: 2

## 2024-01-04 NOTE — PROGRESS NOTES
[facility]   Mental Health & Addiction Services     Disclaimer: Voice recognition software was used to generate this note. As a result, wrong word or 'sound-a-like' substitutions may have occurred due to the inherent limitations of voice recognition software. There may be errors in the script that have gone undetected. Please consider this when interpreting information found in this chart.     ADHD assessment - Initial Visit    Patient  Name:  Lance Ramos Date: January 4, 2024         Service Type: Individual     Visit Start Time: 1:01 PM  Visit End Time: 1:45PM    Visit #: 1    Attendees: Client attended alone    Service Modality:  Video Visit:      Provider verified identity through the following two step process.  Patient provided:  Patient photo    Telemedicine Visit: The patient's condition can be safely assessed and treated via synchronous audio and visual telemedicine encounter.      Reason for Telemedicine Visit: Services only offered telehealth    Originating Site (Patient Location): Patient's home    Distant Site (Provider Location): Provider Remote Setting- Home Office    Consent:  The patient/guardian has verbally consented to: the potential risks and benefits of telemedicine (video visit) versus in person care; bill my insurance or make self-payment for services provided; and responsibility for payment of non-covered services.     Patient would like the video invitation sent by:  My Chart    Mode of Communication:  Video Conference via [facility]    Distant Location (Provider):  Off-site    As the provider I attest to compliance with applicable laws and regulations related to telemedicine.    Interactive Complexity: No   Crisis: No      Current Mental Status Exam:   Appearance:  Appropriate    Eye Contact:  Good   Psychomotor:  Normal       Gait / station:  no problem  Attitude / Demeanor: Cooperative  Interested  Speech      Rate / Production: Normal/ Responsive       Volume:  Normal  volume      Language:  intact  Mood:   Normal  Affect:   Appropriate    Thought Content: Clear   Thought Process: Coherent       Associations: No loosening of associations  Insight:   Good   Judgment:  Intact   Orientation:  All  Attention/concentration: Fair     Safety Issues and Plan for Safety and Risk Management:   Starr Suicide Severity Rating Scale (Short Version)      2024     3:00 PM   Starr Suicide Severity Rating (Short Version)   Q1 Wished to be Dead (Past Month) no   Q2 Suicidal Thoughts (Past Month) no   Q6 Suicide Behavior (Lifetime) no     Patient denies current fears or concerns for personal safety.  Patient denies current or recent suicidal ideation or behaviors.  Patient denies current or recent homicidal ideation or behaviors.  Patient denies current or recent self injurious behavior or ideation.  Patient denies other safety concerns.  Recommended that patient call 911 or go to the local ED should there be a change in any of these risk factors.  Patient reports there are no firearms in the house.    DATA:      Presenting Concerns/  Current Stressors:   Client presents for session 1 of ADHD assessment.     Reason for doing assessment now: Spouse has been noticing it more. He has always been easily distracted. Can't follow instructions. Wife notices he bounces legs. Really bad as a kid. Doodling. Talks with hands some.     Previous testing, diagnoses, medication: denied    Hospitalizations, suicide attempts, thoughts: Denied    Family mental health history: Sister has similar symptoms. Mom had anxiety as she got older.     History of Trauma, recent losses, stressors:  Oldest brother  at age 26 when client was in 9th grade. Hard on while family. Other brother  recently, mom  fall .    Grade school/Middle school experience: Dad worked 3 jobs, older sibs were out of house. One brother and mom. Not much social interaction.     Potential talks: A-B student  worked hard. No problems taking criticism.    Hyperactivity, disruptive behavior: Leg bouncing    Symptoms of Bipolar Disorder:   Family History: no    Legal involvement: Denied    Family CD history: Family drank a lot.     Current drinking, Cannabis use: Has gout. Avoids most alcohol. Light beer 1-2 on weekends. No cannabis.        ASSESSMENT:       Diagnostic Criteria:  Attention Deficit Hyperactivity Disorder  A) A persistent pattern of inattention and/or hyperactivity-impulsivity that interferes with functioning or development, as characterized by (1) Inattention and/or (2) Hyperactivity and Impulsivity  (1) Inattention: 6 or more of the following symptoms have persisted for at least 6 months to a degree that is inconsistent with developmental level and that negatively impacts directly on social and academic/occupational activities:  - Often fails to give close attention to details or makes careless mistakes in schoolwork, at work, or during other activities  - Often has difficulty sustaining attention in tasks or play activities  - Often does not seem to listen when spoken to directly  - Often does not follow through on instructions and fails to finish schoolwork, chores, or duties in the workplace  - Often has difficulty organizing tasks and activities  - Often avoids, dislikes, or is reluctant to engage in tasks that require sustained mental effort  - Often loses things necessary for tasks or activities  - Is often easily distractedby extraneous stimuli  - Is often forgetful in daily activities  (2) Hyeractivity and Impulsivity: 6 or more of the following symptoms have persisted for at least 6 months to a degree that is inconsistent with developmental level and that negatively impacts directly on social and academic/occupational activities:  - Often fidgets with or taps hands or feet or squirms in seat  B) Several inattentive or hyperactive-impulsive symptoms were present prior to age 12 years  C) Several  inattentive or hyperactive-impulsive symptoms are present in two or more settings  D) There is clear evidence that the symptoms interfere with, or reduce the quality of, social academic, or occupational functioning  E) The Symptoms do not occur exclusively during the course of schizophrenia or another psychotic disorder and are not better explained by another mental disorder      DSM5 Diagnoses: (Sustained by DSM5 Criteria Listed Above)  Diagnoses: Attention-Deficit/Hyperactivity Disorder  314.00 (F90.0) Predominantly inattentive presentation  Psychosocial & Contextual Factors: associated depression and anxiety    Intervention:   Educated on treatment planning and started identifying goals and interventions for treatment plan    Collateral Reports Completed:  None      PLAN: (Homework, other):  1. Provider will continue Diagnostic Assessment.  Patient was given the following to do until next session: Complete ADHD rating scales.    2. Provider recommended the following referrals: None.      3.  Suicide Risk and Safety Concerns were assessed for Lance Ramos.    Patient meets the following risk assessment and triage: Patient denied any current/recent/lifetime history of suicidal ideation and/or behaviors.  No safety plan indicated at this time.       Walter Bean, PhD  January 4, 2024        Answers submitted by the patient for this visit:  Patient Health Questionnaire (Submitted on 1/4/2024)  If you checked off any problems, how difficult have these problems made it for you to do your work, take care of things at home, or get along with other people?: Not difficult at all  PHQ9 TOTAL SCORE: 2  MICHAEL-7 (Submitted on 1/4/2024)  MICHAEL 7 TOTAL SCORE: 9

## 2024-01-11 ENCOUNTER — VIRTUAL VISIT (OUTPATIENT)
Dept: PSYCHOLOGY | Facility: CLINIC | Age: 54
End: 2024-01-11
Payer: COMMERCIAL

## 2024-01-11 DIAGNOSIS — F90.0 ADHD (ATTENTION DEFICIT HYPERACTIVITY DISORDER), INATTENTIVE TYPE: Primary | ICD-10-CM

## 2024-01-11 PROCEDURE — 90791 PSYCH DIAGNOSTIC EVALUATION: CPT | Mod: FQ | Performed by: PSYCHOLOGIST

## 2024-01-11 NOTE — PROGRESS NOTES
"    [facility]    Disclaimer: Voice recognition software was used to generate this note. As a result, wrong word or 'sound-a-like' substitutions may have occurred due to the inherent limitations of voice recognition software. There may be errors in the script that have gone undetected. Please consider this when interpreting information found in this chart.     Provider Name:  Walter Bean PhD, LP    PATIENT'S NAME: Lance Ramos  PREFERRED NAME: Brijesh  PRONOUNS:       MRN: [id number]  : 1970  ADDRESS: [address] [place] [id number]  [facility]  [id number]  DATE OF SERVICE: 24  START TIME: 10:01 AM  END TIME: 10:45  PREFERRED PHONE: [phone]  May we leave a program related message: Yes  SERVICE MODALITY:  Phone Visit:      Provider verified identity through the following two step process.  Patient provided:  Patient is known previously to provider    Telephone Visit: The patient's condition can be safely assessed and treated via synchronous audio telemedicine encounter.      Reason for Audio Telemedicine Visit: Services only offered telehealth    Originating Site (Patient Location): Patient's home    Distant Site (Provider Location): Provider Remote Setting- Home Office    Consent:  The patient/guardian has verbally consented to:     1. The potential risks and benefits of telemedicine (telephone visit) versus in person care;    The patient has been notified of the following:      \"We have found that certain health care needs can be provided without the need for a face to face visit.  This service lets us provide the care you need with a phone conversation.       I will have full access to your [facility] medical record during this entire phone call.   I will be taking notes for your medical record.      Since this is like an office visit, we will bill your insurance company for this service.       There are potential benefits and risks of telephone visits (e.g. limits " "to patient confidentiality) that differ from in-person visits.?Confidentiality still applies for telephone services, and nobody will record the visit.  It is important to be in a quiet, private space that is free of distractions (including cell phone or other devices) during the visit.??      If during the course of the call I believe a telephone visit is not appropriate, you will not be charged for this service\"     Consent has been obtained for this service by care team member: Yes     UNIVERSAL ADULT ADHD DIAGNOSTIC ASSESSMENT    Identifying Information:  Patient is a 53 year old, .  The pronoun use throughout this assessment reflects the patient's chosen pronoun.  Patient was referred for an assessment by self.  Patient attended the session alone.    Chief Complaint:   The purpose of this evaluation is to: provide treatment recommendations and clarify diagnosis. Patient reported seeking services at this time for diagnostic assessment and recommendations for treatment.      Current Mental Status Exam:   Appearance:                Appropriate    Eye Contact:               Good   Psychomotor:              Normal       Gait / station:           no problem  Attitude / Demeanor:   Cooperative  Interested  Speech      Rate / Production:   Normal/ Responsive      Volume:                   Normal  volume      Language:               intact  Mood:                          Normal  Affect:                          Appropriate    Thought Content:        Clear   Thought Process:        Coherent       Associations:           No loosening of associations  Insight:                         Good   Judgment:                   Intact   Orientation:                 All  Attention/concentration:          Fair           Safety Issues and Plan for Safety and Risk Management:              [place] Suicide Severity Rating Scale (Short Version)       1/4/2024     3:00 PM   [place] Suicide Severity Rating (Short Version)   Q1 " Wished to be Dead (Past Month) no   Q2 Suicidal Thoughts (Past Month) no   Q6 Suicide Behavior (Lifetime) no      Patient denies current fears or concerns for personal safety.  Patient denies current or recent suicidal ideation or behaviors.  Patient denies current or recent homicidal ideation or behaviors.  Patient denies current or recent self injurious behavior or ideation.  Patient denies other safety concerns.  Recommended that patient call 911 or go to the local ED should there be a change in any of these risk factors.  Patient reports there are no firearms in the house.     DATA:                            Presenting Concerns/  Current Stressors:              Client presents for session 1 of ADHD assessment.      Reason for doing assessment now: Spouse has been noticing it more. He has always been easily distracted. Can't follow instructions. Wife notices he bounces legs. Really bad as a kid. Doodling. Talks with hands some.      Previous testing, diagnoses, medication: denied     Hospitalizations, suicide attempts, thoughts: Denied     Family mental health history: Sister has similar symptoms. Mom had anxiety as she got older.      History of Trauma, recent losses, stressors:  Oldest brother  at age 26 when client was in 9th grade. Hard on while family. Other brother  recently, mom  fall .     Grade school/Middle school experience: Dad worked 3 jobs, older sibs were out of house. One brother and mom. Not much social interaction.      Potential talks: A-B student worked hard. No problems taking criticism.     Hyperactivity, disruptive behavior: Leg bouncing     Symptoms of Bipolar Disorder:   Family History: no     Legal involvement: Denied     Family CD history: Family drank a lot.      Current drinking, Cannabis use: Has gout. Avoids most alcohol. Light beer 1-2 on weekends. No cannabis.          Social/Family History:  Patient reported they grew up in  [place], [place].  They were  raised by biological parents.  Parents stayed ..  Patient reported that their childhood was Pretty good, no complaints. Dad worked 3 jobs so didn't see him much. Into athletics..  Patient described their current relationships with family of origin as close with siblings.      The patient describes their cultural background as American.    Patient identified their preferred language to be english Patient reported they do not need the assistance of an  or other support involved in therapy.     Patient reported had no significant delays in developmental tasks.    Patient identified the following learning problems: attention and concentration.  Modifications will not be used to assist communication in therapy. Patient reports they are able to understand written materials.      Patient reported the following relationship history one long term relationship of almost 20 years.   Patient identified their sexual orientation as heterosexual.  Patient reported having three child(cleo). Patient identified partner and adult child as part of their support system.  Patient identified the quality of these relationships as good.      Patient's current living/housing situation involves staying in own home/apartment.  They live with spouse and son and they report that housing is stable.     Patient is currently employed full time and reports they are able to function appropriately at work..  for [employer]. Patient reports their finances are obtained through employment.  Patient does identify finances as a current stressor.      Patient reported that they have not been involved with the legal system.      ADHD Symptom History  Client's highest education level was college graduate. During the elementary, middle, and high school years, patient recalls academic strengths in the area of math and athletics. Client reported experiencing academic problems in science and anything with a lot of rules . Client  did not identify any learning problems. Client did not receive tutoring services during the school years. Client did not receive special education services. Client reported no particular problems during the school years. Client did attend post secondary school.   Client reported no difficulty with childhood peer relationships.As a child, client reported that he had problems with organization and keeping track of items, misplaced or lost things, and caused damage to property.   Client reported that he is currently employed. Client reported that the current job is a good fit for his skills and personality.  Client reported that he disorganized behavior, distractible behavior, and problems learning new materials .  The client's work history includes: , Factory work, retail support, landscaping, snow removal..  The longest period of employment has been 33 year.  Client has not been terminated from a place of employment.    Health Habits  As a child, client reported having regular and consistent sleep patterns.  Client reported currently experiencing sleep disturbance, including: insomnia and snoring.  Client reported sleeping approximately 7-8 hours per night.  Client reported that he has not completed a sleep study.  Client reported having a well balanced diet.  There are not significant nutritional concerns.  Client reported engaging in regular exercise.    Patient has received a 's license.  Patient has not received any moving violations.  Patient reported the following driving habits: attentive and cautious.  According to client, other people are comfortable riding as passengers when he is driving.     Patient's Strengths and Limitations:  Patient identified the following strengths or resources that will help them succeed in treatment: friends / good social support. Things that may interfere with the patient's success in treatment include: none identified.     Personal and Family Medical  History:  Patient does not report a family history of mental health concerns.  Patient reports family history includes Cerebrovascular Disease in his father; Deep Vein Thrombosis (age of onset: 26) in his brother; Hyperlipidemia in his brother; Pancreatic Cancer in his father; Valvular heart disease in his brother..     Patient does not report Mental Health Diagnosis or Treatment.      Patient has had a physical exam to rule out medical causes for current symptoms.  Date of last physical exam was within the past year. Client was encouraged to follow up with PCP if symptoms were to develop. The patient has a [place] Primary Care Provider, who is named Dayan Luna..  Patient reports  Gout .  Patient denies any issues with pain..   There are not significant appetite / nutritional concerns / weight changes.   Patient does report a history of head injury / trauma / cognitive impairment.  Concussion playing hockey.    Patient reports current meds as:   No outpatient medications have been marked as taking for the 1/11/24 encounter (Appointment) with Walter Bean, PhD.       Medication Adherence:  Patient reports taking.  taking prescribed medications as prescribed.    Patient Allergies:    Allergies   Allergen Reactions    Nka [No Known Allergies]        Medical History:    Past Medical History:   Diagnosis Date    Ankle joint pain     Epistaxis     Hyperlipidemia     Snores          Substance Use:  Patient did report a family history of substance use concerns; see medical history section for details.  Patient has not received chemical dependency treatment in the past.  Patient has not ever been to detox.      Patient is not currently receiving any chemical dependency treatment.           Substance History of use Age of first use Date of last use     Pattern and duration of use (include amounts and frequency)   Alcohol currently use   16 12/30/23 Weekends 1-2   Cannabis   never used     REPORTS SUBSTANCE  USE: N/A     Amphetamines   never used     REPORTS SUBSTANCE USE: N/A   Cocaine/crack    never used       REPORTS SUBSTANCE USE: N/A   Hallucinogens never used         REPORTS SUBSTANCE USE: N/A   Inhalants never used         REPORTS SUBSTANCE USE: N/A   Heroin never used         REPORTS SUBSTANCE USE: N/A   Other Opiates used in the past JOSÉ ANTONIO-as prescribed by doctor per medical condition 11/01/20 REPORTS SUBSTANCE USE: N/A   Benzodiazepine   never used     REPORTS SUBSTANCE USE: N/A   Barbiturates never used     REPORTS SUBSTANCE USE: N/A   Over the counter meds used in the past JOSÉ ANTONIO 12/01/23 REPORTS SUBSTANCE USE: N/A   Caffeine currently use 16   2 coffee/day   Nicotine  never used     REPORTS SUBSTANCE USE: N/A   Other substances not listed above:  Identify:  never used     REPORTS SUBSTANCE USE: N/A     Patient reported the following problems as a result of their substance use: no problems, not applicable.     CAGE- AID:        1/4/2024     9:53 AM   CAGE-AID Total Score   Total Score 0   Total Score MyChart 0 (A total score of 2 or greater is considered clinically significant)       Substance Use: No symptoms    Based on the negative CAGE score and clinical interview there  are not indications of drug or alcohol abuse.          Rating Scales:    PHQ9:        1/4/2024     9:03 AM   PHQ-9 SCORE   PHQ-9 Total Score MyChart 2 (Minimal depression)   PHQ-9 Total Score 2       GAD7:        1/4/2024     9:42 AM   MICHAEL-7 SCORE   Total Score 9 (mild anxiety)   Total Score 9    9     CGI:     First:No data recorded    Most recentNo data recorded      Significant Losses / Trauma / Abuse / Neglect Issues:   Patient did not  serve in the .  There are indications or report of significant loss, trauma, abuse or neglect issues related to: death of family members .  Concerns for possible neglect are not present.         History of Safety Concerns:  Patient denied a history of homicidal ideation.     Patient denied a  history of personal safety concerns.    Patient denied a history of assaultive behaviors.    Patient denied a history of sexual assault behaviors.     Patient denied a history of risk behaviors associated with substance use.  Patient denies any history of high risk behaviors associated with mental health symptoms.  Patient reports the following protective factors: forward or future oriented thinking; dedication to family or friends; regular sleep; regular physical activity; commitment to well being; financial stability    Risk Plan:  See Recommendations for Safety and Risk Management Plan    Review of Symptoms per patient report:    Depression:  poor concentration  [place]:  No Symptoms  Psychosis: No Symptoms  Anxiety: feeling on edge/nervous/anxious, difficulty controlling worry, worrying about many different things, trouble relaxing, and being restless  Panic:  No symptoms  Post Traumatic Stress Disorder:  No Symptoms   Eating Disorder: No Symptoms  ADD / ADHD:  Inattentive, Poor organizational skills, Distractibility, and Forgetful  Conduct Disorder: No symptoms  Autism Spectrum Disorder: No symptoms  Obsessive Compulsive Disorder: No Symptoms    Patient reports the following compulsive behaviors and treatment history:  None .      Diagnostic Criteria:   Attention Deficit Hyperactivity Disorder  A) A persistent pattern of inattention and/or hyperactivity-impulsivity that interferes with functioning or development, as characterized by (1) Inattention and/or (2) Hyperactivity and Impulsivity  (1) Inattention: 6 or more of the following symptoms have persisted for at least 6 months to a degree that is inconsistent with developmental level and that negatively impacts directly on social and academic/occupational activities:  - Often fails to give close attention to details or makes careless mistakes in schoolwork, at work, or during other activities  - Often has difficulty sustaining attention in tasks or play  activities  - Often has difficulty organizing tasks and activities  - Often loses things necessary for tasks or activities  - Is often easily distractedby extraneous stimuli  - Is often forgetful in daily activities  B) Several inattentive or hyperactive-impulsive symptoms were present prior to age 10 years  C) Several inattentive or hyperactive-impulsive symptoms are present in two or more settings  D) There is clear evidence that the symptoms interfere with, or reduce the quality of, social academic, or occupational functioning  E) The Symptoms do not occur exclusively during the course of schizophrenia or another psychotic disorder and are not better explained by another mental disorder    Functional Status:  Patient reports the following functional impairments: relationship(s) and work / vocational responsibilities.       Nonprogrammatic care:  Patient is requesting basic services to address current mental health concerns.    Clinical Summary:  1. Reason for assessment: ADHD Evaluation .    3. Principal DSM5 Diagnoses  (Sustained by DSM5 Criteria Listed Above):   Attention-Deficit/Hyperactivity Disorder  314.00 (F90.0) Predominantly inattentive presentation.  4. Other Diagnoses that is relevant to services:   None  5. Provisional Diagnosis:  None  6. Prognosis: Expect Improvement.  7. Likely consequences of symptoms if not treated: worsening depression and anxiety.  8. Client strengths include:  support of family, friends and providers .     Recommendations:     1. Plan for Safety and Risk Management:   Recommended that patient call 911 or go to the local ED should there be a change in any of these risk factors..          Report to child / adult protection services was NA.         3. Initial Treatment will focus on:    ADHD Testing:  Patient was given self and collaborative rating scales to be completed prior to the next appointment.  Depression and anxiety rating scales were completed.       4.  Resources/Service Plan:    services are not indicated.   Modifications to assist communication are not indicated.   Additional disability accommodations are not indicated.      5. Collaboration:   Collaboration / coordination of treatment will be initiated with the following support professionals: Not indicated.      6.  Referrals:   The following referral(s) will be initiated: N/A .   A Release of Information has been obtained for the following:  N/A     7. ANUJ:    ANUJ:  Discussed Discussed the general effects of drugs and alcohol on health and well-being. Provider gave patient printed information about the effects of chemical use on their health and well being. Recommendations:  None .     8. Records:   These were reviewed at time of assessment.   Information in this assessment was obtained from the medical record and  provided by patient who is a fair historian.    Patient will have open access to their mental health medical record.      Provider Name/ Credentials:  Walter Bean PhD, LP January 11, 2024     Answers submitted by the patient for this visit:  MICHAEL-7 (Submitted on 1/4/2024)  MICHAEL 7 TOTAL SCORE: 9

## 2024-01-31 ENCOUNTER — VIRTUAL VISIT (OUTPATIENT)
Dept: PSYCHOLOGY | Facility: CLINIC | Age: 54
End: 2024-01-31
Payer: COMMERCIAL

## 2024-01-31 DIAGNOSIS — F90.0 ADHD (ATTENTION DEFICIT HYPERACTIVITY DISORDER), INATTENTIVE TYPE: Primary | ICD-10-CM

## 2024-01-31 PROCEDURE — 90834 PSYTX W PT 45 MINUTES: CPT | Mod: 95 | Performed by: PSYCHOLOGIST

## 2024-01-31 NOTE — PROGRESS NOTES
Progress Note  Disclaimer: Voice recognition software was used to generate this note. As a result, wrong word or 'sound-a-like' substitutions may have occurred due to the inherent limitations of voice recognition software. There may be errors in the script that have gone undetected. Please consider this when interpreting information found in this chart.       Client Name: Lance Ramos  Date: 1/31/2024      Service Type: Individual      Session Start Time: 5:00 PM  Session End Time: 5:45PM     Session Length: 45    Session #: 3    Attendees: Client attended alone    Service Modality:  Video Visit:      Provider verified identity through the following two step process.  Patient provided:  Patient is known previously to provider    Telemedicine Visit: The patient's condition can be safely assessed and treated via synchronous audio and visual telemedicine encounter.      Reason for Telemedicine Visit: Services only offered telehealth    Originating Site (Patient Location): Patient's home    Distant Site (Provider Location): Provider Remote Setting- Home Office    Consent:  The patient/guardian has verbally consented to: the potential risks and benefits of telemedicine (video visit) versus in person care; bill my insurance or make self-payment for services provided; and responsibility for payment of non-covered services.     Patient would like the video invitation sent by:  My Chart    Mode of Communication:  [facility] Conference [facility]    Distant Location (Provider):  Off-site    As the provider I attest to compliance with applicable laws and regulations related to telemedicine.     Treatment Plan Last Reviewed: Today  PHQ-9 / MICHAEL-7 : See Flowsheets    DATA  Interactive Complexity: NO  Crisis: NO            DATA   ADHD Assessment Follow-up session  R/O Sleep Apnea. [place] = 9. Dogs get him up 2-4 times per night. Splits shifts with wife. Falling asleep is usually not a  problem unless some stress. 2-3 days out of 5.   Blew part of cnsvs  test dt technical glitch.        Progress Since Last Session (Related to Symptoms / Goals / Homework):   Symptoms: No change stable    Homework: Achieved / completed to satisfaction      Episode of Care Goals: Satisfactory progress - ACTION (Actively working towards change); Intervened by reinforcing change plan / affirming steps taken     Current / Ongoing Stressors and Concerns:   Reviewed medication response, client's efforts at self-education, changes in work/school and relationships     Treatment Objective(s) Addressed in This Session:   Reviewed results of testing, provided ADHD Psychoeducation tips and resources       Intervention:   psychoeducation regarding ADHD        ASSESSMENT: Current Emotional / Mental Status (status of significant symptoms):   Risk status (Self / Other harm or suicidal ideation)   Client denies current fears or concerns for personal safety.   Client denies current or recent suicidal ideation or behaviors.   Client denies current or recent homicidal ideation or behaviors.   Client denies current or recent self injurious behavior or ideation.   Client denies other safety concerns.   Recommended that patient call 911 or go to the local ED should there be a change in any of these risk factors.     Appearance:   Appropriate    Eye Contact:   Good    Psychomotor Behavior: Normal    Attitude:   Cooperative    Orientation:   All   Speech    Rate / Production: Normal     Volume:  Normal    Mood:    Normal   Affect:    Appropriate    Thought Content:  Clear    Thought Form:  Coherent  Logical    Insight:    Good      Medication Review:   No current psychiatric medications prescribed     Medication Compliance:   NA     Changes in Health Issues:   None reported     Chemical Use Review:   Substance Use: Chemical use reviewed, no active concerns identified      Tobacco Use: No current tobacco use.       Collateral Reports  Completed:   Not Applicable    Diagnosis:  1. ADHD (attention deficit hyperactivity disorder), inattentive type         PLAN: (Client Tasks / Therapist Tasks / Other)  Instructed client in sleep hygiene. and revisit in 2 months.        Walter Bean

## 2024-11-17 ENCOUNTER — HEALTH MAINTENANCE LETTER (OUTPATIENT)
Age: 54
End: 2024-11-17

## 2025-05-03 NOTE — NURSING NOTE
"Chief Complaint   Patient presents with     Consult     patient had a recent flare up of gout in feet; wants to discuss medication       Initial /82  Pulse 88  Temp 96.6  F (35.9  C) (Tympanic)  Ht 5' 6\" (1.676 m)  Wt 211 lb (95.7 kg)  BMI 34.06 kg/m2 Estimated body mass index is 34.06 kg/(m^2) as calculated from the following:    Height as of this encounter: 5' 6\" (1.676 m).    Weight as of this encounter: 211 lb (95.7 kg).  Medication Reconciliation: complete  " Unable to reach Caller  after THREE attempts.  Messages left on voicemail to call back, advised to call 911 if experiencing a life threatening emergency.    Reason for Disposition  • Third attempt to contact caller AND no contact made. Phone number verified.    Protocols used: No Contact or Duplicate Contact Call-A-

## (undated) RX ORDER — GLYCOPYRROLATE 0.2 MG/ML
INJECTION, SOLUTION INTRAMUSCULAR; INTRAVENOUS
Status: DISPENSED
Start: 2020-03-05

## (undated) RX ORDER — LIDOCAINE HYDROCHLORIDE 10 MG/ML
INJECTION, SOLUTION EPIDURAL; INFILTRATION; INTRACAUDAL; PERINEURAL
Status: DISPENSED
Start: 2020-03-05